# Patient Record
Sex: MALE | Race: WHITE | NOT HISPANIC OR LATINO | Employment: FULL TIME | RURAL
[De-identification: names, ages, dates, MRNs, and addresses within clinical notes are randomized per-mention and may not be internally consistent; named-entity substitution may affect disease eponyms.]

---

## 2022-06-07 ENCOUNTER — OFFICE VISIT (OUTPATIENT)
Dept: FAMILY MEDICINE CLINIC | Facility: CLINIC | Age: 47
End: 2022-06-07

## 2022-06-07 VITALS
DIASTOLIC BLOOD PRESSURE: 60 MMHG | HEIGHT: 70 IN | WEIGHT: 309.8 LBS | BODY MASS INDEX: 44.35 KG/M2 | SYSTOLIC BLOOD PRESSURE: 138 MMHG | TEMPERATURE: 98.2 F | HEART RATE: 80 BPM | OXYGEN SATURATION: 95 %

## 2022-06-07 DIAGNOSIS — Z71.6 TOBACCO ABUSE COUNSELING: ICD-10-CM

## 2022-06-07 DIAGNOSIS — Z12.11 ENCOUNTER FOR SCREENING COLONOSCOPY: ICD-10-CM

## 2022-06-07 DIAGNOSIS — E11.40 TYPE 2 DIABETES MELLITUS WITH DIABETIC NEUROPATHY, WITH LONG-TERM CURRENT USE OF INSULIN: ICD-10-CM

## 2022-06-07 DIAGNOSIS — J44.9 CHRONIC OBSTRUCTIVE PULMONARY DISEASE, UNSPECIFIED COPD TYPE: ICD-10-CM

## 2022-06-07 DIAGNOSIS — I10 ESSENTIAL HYPERTENSION: ICD-10-CM

## 2022-06-07 DIAGNOSIS — Z79.01 CHRONIC ANTICOAGULATION: ICD-10-CM

## 2022-06-07 DIAGNOSIS — Z00.00 ANNUAL PHYSICAL EXAM: ICD-10-CM

## 2022-06-07 DIAGNOSIS — Z99.89 OSA ON CPAP: ICD-10-CM

## 2022-06-07 DIAGNOSIS — Z13.29 ENCOUNTER FOR SCREENING FOR ENDOCRINE DISORDER: ICD-10-CM

## 2022-06-07 DIAGNOSIS — E11.649 UNCONTROLLED TYPE 2 DIABETES MELLITUS WITH HYPOGLYCEMIA WITHOUT COMA: ICD-10-CM

## 2022-06-07 DIAGNOSIS — Z13.1 ENCOUNTER FOR SCREENING FOR DIABETES MELLITUS: ICD-10-CM

## 2022-06-07 DIAGNOSIS — M21.619 BUNION: ICD-10-CM

## 2022-06-07 DIAGNOSIS — E66.01 MORBID (SEVERE) OBESITY DUE TO EXCESS CALORIES: Primary | ICD-10-CM

## 2022-06-07 DIAGNOSIS — Z13.6 ENCOUNTER FOR SCREENING FOR CARDIOVASCULAR DISORDERS: ICD-10-CM

## 2022-06-07 DIAGNOSIS — Z72.0 TOBACCO USER: ICD-10-CM

## 2022-06-07 DIAGNOSIS — E78.2 MIXED HYPERLIPIDEMIA: ICD-10-CM

## 2022-06-07 DIAGNOSIS — Z79.4 TYPE 2 DIABETES MELLITUS WITH DIABETIC NEUROPATHY, WITH LONG-TERM CURRENT USE OF INSULIN: ICD-10-CM

## 2022-06-07 DIAGNOSIS — G47.33 OSA ON CPAP: ICD-10-CM

## 2022-06-07 DIAGNOSIS — I48.11 LONGSTANDING PERSISTENT ATRIAL FIBRILLATION: ICD-10-CM

## 2022-06-07 DIAGNOSIS — Z11.59 NEED FOR HEPATITIS C SCREENING TEST: ICD-10-CM

## 2022-06-07 DIAGNOSIS — E11.8 DIABETIC FOOT: ICD-10-CM

## 2022-06-07 PROCEDURE — 99204 OFFICE O/P NEW MOD 45 MIN: CPT | Performed by: FAMILY MEDICINE

## 2022-06-07 RX ORDER — LISINOPRIL 2.5 MG/1
2.5 TABLET ORAL DAILY
Qty: 30 TABLET | Refills: 3 | Status: SHIPPED | OUTPATIENT
Start: 2022-06-07 | End: 2023-03-13 | Stop reason: SDUPTHER

## 2022-06-07 RX ORDER — LISINOPRIL 2.5 MG/1
2.5 TABLET ORAL DAILY
COMMUNITY
End: 2022-06-07 | Stop reason: SDUPTHER

## 2022-06-07 RX ORDER — ATORVASTATIN CALCIUM 40 MG/1
40 TABLET, FILM COATED ORAL DAILY
Qty: 30 TABLET | Refills: 3 | Status: SHIPPED | OUTPATIENT
Start: 2022-06-07 | End: 2023-03-13 | Stop reason: SDUPTHER

## 2022-06-07 RX ORDER — GABAPENTIN 300 MG/1
300 CAPSULE ORAL 3 TIMES DAILY
COMMUNITY
End: 2022-06-07 | Stop reason: SDUPTHER

## 2022-06-07 RX ORDER — APIXABAN 5 MG/1
5 TABLET, FILM COATED ORAL DAILY
COMMUNITY
Start: 2022-03-05 | End: 2022-06-07 | Stop reason: SDUPTHER

## 2022-06-07 RX ORDER — FLASH GLUCOSE SENSOR
1 KIT MISCELLANEOUS 4 TIMES DAILY
Qty: 1 EACH | Refills: 3 | Status: SHIPPED | OUTPATIENT
Start: 2022-06-07 | End: 2022-06-07 | Stop reason: SDUPTHER

## 2022-06-07 RX ORDER — CELECOXIB 200 MG/1
200 CAPSULE ORAL AS NEEDED
COMMUNITY
Start: 2022-03-22

## 2022-06-07 RX ORDER — ALBUTEROL SULFATE 90 UG/1
2 AEROSOL, METERED RESPIRATORY (INHALATION) EVERY 4 HOURS PRN
Qty: 18 G | Refills: 3 | Status: SHIPPED | OUTPATIENT
Start: 2022-06-07 | End: 2022-09-10 | Stop reason: SDUPTHER

## 2022-06-07 RX ORDER — GEMFIBROZIL 600 MG/1
600 TABLET, FILM COATED ORAL 2 TIMES DAILY
Qty: 60 TABLET | Refills: 3 | Status: SHIPPED | OUTPATIENT
Start: 2022-06-07 | End: 2023-03-13 | Stop reason: SDUPTHER

## 2022-06-07 RX ORDER — FLURBIPROFEN SODIUM 0.3 MG/ML
1 SOLUTION/ DROPS OPHTHALMIC 4 TIMES DAILY
Qty: 100 EACH | Refills: 3 | Status: CANCELLED | OUTPATIENT
Start: 2022-06-07

## 2022-06-07 RX ORDER — INSULIN ASPART 100 [IU]/ML
20 INJECTION, SOLUTION INTRAVENOUS; SUBCUTANEOUS
COMMUNITY
Start: 2022-03-01 | End: 2022-06-07 | Stop reason: SDUPTHER

## 2022-06-07 RX ORDER — FLASH GLUCOSE SENSOR
1 KIT MISCELLANEOUS
Qty: 2 EACH | Refills: 3 | Status: SHIPPED | OUTPATIENT
Start: 2022-06-07 | End: 2022-10-27 | Stop reason: SDUPTHER

## 2022-06-07 RX ORDER — ATENOLOL 25 MG/1
25 TABLET ORAL 2 TIMES DAILY
COMMUNITY
End: 2022-06-07 | Stop reason: SDUPTHER

## 2022-06-07 RX ORDER — FLURBIPROFEN SODIUM 0.3 MG/ML
1 SOLUTION/ DROPS OPHTHALMIC 4 TIMES DAILY
Qty: 1000 EACH | Refills: 3 | Status: SHIPPED | OUTPATIENT
Start: 2022-06-07 | End: 2022-12-19 | Stop reason: SDUPTHER

## 2022-06-07 RX ORDER — ATORVASTATIN CALCIUM 40 MG/1
40 TABLET, FILM COATED ORAL DAILY
COMMUNITY
End: 2022-06-07 | Stop reason: SDUPTHER

## 2022-06-07 RX ORDER — FLASH GLUCOSE SENSOR
KIT MISCELLANEOUS
COMMUNITY
Start: 2022-04-19 | End: 2022-06-07 | Stop reason: SDUPTHER

## 2022-06-07 RX ORDER — METFORMIN HYDROCHLORIDE 500 MG/1
500 TABLET, EXTENDED RELEASE ORAL 3 TIMES DAILY
Qty: 90 TABLET | Refills: 3 | Status: SHIPPED | OUTPATIENT
Start: 2022-06-07 | End: 2023-03-13 | Stop reason: SDUPTHER

## 2022-06-07 RX ORDER — GABAPENTIN 300 MG/1
300 CAPSULE ORAL 3 TIMES DAILY
Qty: 90 CAPSULE | Refills: 2 | Status: SHIPPED | OUTPATIENT
Start: 2022-06-07 | End: 2022-06-07 | Stop reason: SDUPTHER

## 2022-06-07 RX ORDER — ATENOLOL 25 MG/1
25 TABLET ORAL 2 TIMES DAILY
Qty: 60 TABLET | Refills: 3 | Status: SHIPPED | OUTPATIENT
Start: 2022-06-07 | End: 2023-03-13 | Stop reason: SDUPTHER

## 2022-06-07 RX ORDER — GEMFIBROZIL 600 MG/1
TABLET, FILM COATED ORAL 2 TIMES DAILY
COMMUNITY
Start: 2022-04-09 | End: 2022-06-07 | Stop reason: SDUPTHER

## 2022-06-07 RX ORDER — FLURBIPROFEN SODIUM 0.3 MG/ML
SOLUTION/ DROPS OPHTHALMIC
COMMUNITY
Start: 2022-03-29 | End: 2022-06-07 | Stop reason: SDUPTHER

## 2022-06-07 RX ORDER — ALBUTEROL SULFATE 90 UG/1
AEROSOL, METERED RESPIRATORY (INHALATION)
COMMUNITY
Start: 2022-04-19 | End: 2022-06-07 | Stop reason: SDUPTHER

## 2022-06-07 RX ORDER — APIXABAN 5 MG/1
5 TABLET, FILM COATED ORAL DAILY
Qty: 30 TABLET | Refills: 3 | Status: SHIPPED | OUTPATIENT
Start: 2022-06-07 | End: 2022-12-09 | Stop reason: SDUPTHER

## 2022-06-07 RX ORDER — APIXABAN 5 MG/1
5 TABLET, FILM COATED ORAL DAILY
Qty: 60 TABLET | Refills: 3 | Status: SHIPPED | OUTPATIENT
Start: 2022-06-07 | End: 2022-06-07 | Stop reason: SDUPTHER

## 2022-06-07 RX ORDER — INSULIN ASPART 100 [IU]/ML
20 INJECTION, SOLUTION INTRAVENOUS; SUBCUTANEOUS
Qty: 9 ML | Refills: 3 | Status: SHIPPED | OUTPATIENT
Start: 2022-06-07 | End: 2022-06-07 | Stop reason: SDUPTHER

## 2022-06-07 RX ORDER — INSULIN DEGLUDEC 200 U/ML
110 INJECTION, SOLUTION SUBCUTANEOUS
COMMUNITY
Start: 2022-02-28 | End: 2022-06-07 | Stop reason: SDUPTHER

## 2022-06-07 RX ORDER — LORATADINE 10 MG/1
10 TABLET ORAL DAILY
COMMUNITY
End: 2022-06-07 | Stop reason: SDUPTHER

## 2022-06-07 RX ORDER — INSULIN DEGLUDEC 200 U/ML
110 INJECTION, SOLUTION SUBCUTANEOUS NIGHTLY
Qty: 9 ML | Refills: 3 | Status: SHIPPED | OUTPATIENT
Start: 2022-06-07 | End: 2022-06-08 | Stop reason: CLARIF

## 2022-06-07 RX ORDER — PANTOPRAZOLE SODIUM 20 MG/1
20 TABLET, DELAYED RELEASE ORAL DAILY
Qty: 30 TABLET | Refills: 3 | Status: SHIPPED | OUTPATIENT
Start: 2022-06-07 | End: 2023-03-13 | Stop reason: SDUPTHER

## 2022-06-07 RX ORDER — METFORMIN HYDROCHLORIDE 500 MG/1
TABLET, EXTENDED RELEASE ORAL 3 TIMES DAILY
COMMUNITY
Start: 2022-05-21 | End: 2022-06-07 | Stop reason: SDUPTHER

## 2022-06-07 RX ORDER — INSULIN ASPART 100 [IU]/ML
20 INJECTION, SOLUTION INTRAVENOUS; SUBCUTANEOUS
Qty: 9 ML | Refills: 3 | Status: SHIPPED | OUTPATIENT
Start: 2022-06-07 | End: 2022-08-12

## 2022-06-07 RX ORDER — PANTOPRAZOLE SODIUM 20 MG/1
TABLET, DELAYED RELEASE ORAL
COMMUNITY
Start: 2022-03-20 | End: 2022-06-07 | Stop reason: SDUPTHER

## 2022-06-07 RX ORDER — GABAPENTIN 300 MG/1
300 CAPSULE ORAL 3 TIMES DAILY
Qty: 90 CAPSULE | Refills: 2 | Status: SHIPPED | OUTPATIENT
Start: 2022-06-07 | End: 2022-10-19 | Stop reason: SDUPTHER

## 2022-06-07 RX ORDER — LORATADINE 10 MG/1
10 TABLET ORAL DAILY
Qty: 30 TABLET | Refills: 3 | Status: SHIPPED | OUTPATIENT
Start: 2022-06-07 | End: 2022-11-06 | Stop reason: SDUPTHER

## 2022-06-07 NOTE — PATIENT INSTRUCTIONS
Will refer to Gastro for colon cancer screening  -will refer to Heart doctor for atrial fibrillation  -will refer to Podiatry for foot issues    Get labwork fasting downstairs  -will call for test restuls    Recheck in 4 weeks    Sent medications.      Can you bring your blood sugars readings on next visit on paper. Before breakfast and 2 hours after meal

## 2022-06-07 NOTE — TELEPHONE ENCOUNTER
Gabapentin has more than one set of directions.     Insurance will not cover tresiba prefer lantus or levemir.

## 2022-06-07 NOTE — TELEPHONE ENCOUNTER
PHARMACY IS NEEDING A CLARIFICATION ON THE empagliflozin (JARDIANCE) 10 MG tablet tablet. PATIENT IS AT THE PHARMACY WAITING

## 2022-06-08 NOTE — TELEPHONE ENCOUNTER
Faxed received from pharmacy stating insurance prefers lantus or levemir to replace tresiba. Per Dr. Tobin replace with Lantus.

## 2022-06-09 ENCOUNTER — TELEPHONE (OUTPATIENT)
Dept: FAMILY MEDICINE CLINIC | Facility: CLINIC | Age: 47
End: 2022-06-09

## 2022-06-10 ENCOUNTER — TELEPHONE (OUTPATIENT)
Dept: FAMILY MEDICINE CLINIC | Facility: CLINIC | Age: 47
End: 2022-06-10

## 2022-06-11 RX ORDER — INSULIN DEGLUDEC 200 U/ML
110 INJECTION, SOLUTION SUBCUTANEOUS NIGHTLY
Qty: 9 ML | Refills: 3 | Status: SHIPPED | OUTPATIENT
Start: 2022-06-11 | End: 2022-07-22

## 2022-06-13 ENCOUNTER — TELEPHONE (OUTPATIENT)
Dept: FAMILY MEDICINE CLINIC | Facility: CLINIC | Age: 47
End: 2022-06-13

## 2022-06-20 ENCOUNTER — LAB (OUTPATIENT)
Dept: LAB | Facility: HOSPITAL | Age: 47
End: 2022-06-20

## 2022-06-20 DIAGNOSIS — Z00.00 ANNUAL PHYSICAL EXAM: ICD-10-CM

## 2022-06-20 DIAGNOSIS — Z13.1 ENCOUNTER FOR SCREENING FOR DIABETES MELLITUS: ICD-10-CM

## 2022-06-20 DIAGNOSIS — Z13.29 ENCOUNTER FOR SCREENING FOR ENDOCRINE DISORDER: ICD-10-CM

## 2022-06-20 DIAGNOSIS — E11.649 UNCONTROLLED TYPE 2 DIABETES MELLITUS WITH HYPOGLYCEMIA WITHOUT COMA: ICD-10-CM

## 2022-06-20 DIAGNOSIS — Z11.59 NEED FOR HEPATITIS C SCREENING TEST: ICD-10-CM

## 2022-06-20 DIAGNOSIS — Z13.6 ENCOUNTER FOR SCREENING FOR CARDIOVASCULAR DISORDERS: ICD-10-CM

## 2022-06-20 LAB
25(OH)D3 SERPL-MCNC: 10.6 NG/ML (ref 30–100)
ALBUMIN SERPL-MCNC: 4.4 G/DL (ref 3.5–5.2)
ALBUMIN UR-MCNC: 3.5 MG/DL
ALBUMIN/GLOB SERPL: 1.8 G/DL
ALP SERPL-CCNC: 108 U/L (ref 39–117)
ALT SERPL W P-5'-P-CCNC: 9 U/L (ref 1–41)
ANION GAP SERPL CALCULATED.3IONS-SCNC: 15.3 MMOL/L (ref 5–15)
AST SERPL-CCNC: 13 U/L (ref 1–40)
BASOPHILS # BLD AUTO: 0.07 10*3/MM3 (ref 0–0.2)
BASOPHILS NFR BLD AUTO: 0.8 % (ref 0–1.5)
BILIRUB SERPL-MCNC: 0.4 MG/DL (ref 0–1.2)
BUN SERPL-MCNC: 10 MG/DL (ref 6–20)
BUN/CREAT SERPL: 15.4 (ref 7–25)
CALCIUM SPEC-SCNC: 9 MG/DL (ref 8.6–10.5)
CHLORIDE SERPL-SCNC: 100 MMOL/L (ref 98–107)
CHOLEST SERPL-MCNC: 105 MG/DL (ref 0–200)
CO2 SERPL-SCNC: 22.7 MMOL/L (ref 22–29)
CREAT SERPL-MCNC: 0.65 MG/DL (ref 0.76–1.27)
CREAT UR-MCNC: 74.3 MG/DL
DEPRECATED RDW RBC AUTO: 41.1 FL (ref 37–54)
EGFRCR SERPLBLD CKD-EPI 2021: 117 ML/MIN/1.73
EOSINOPHIL # BLD AUTO: 0.12 10*3/MM3 (ref 0–0.4)
EOSINOPHIL NFR BLD AUTO: 1.4 % (ref 0.3–6.2)
ERYTHROCYTE [DISTWIDTH] IN BLOOD BY AUTOMATED COUNT: 14.3 % (ref 12.3–15.4)
GLOBULIN UR ELPH-MCNC: 2.5 GM/DL
GLUCOSE SERPL-MCNC: 142 MG/DL (ref 65–99)
HBA1C MFR BLD: 9 % (ref 4.8–5.6)
HCT VFR BLD AUTO: 53.4 % (ref 37.5–51)
HCV AB SER DONR QL: NORMAL
HDLC SERPL-MCNC: 24 MG/DL (ref 40–60)
HGB BLD-MCNC: 17.7 G/DL (ref 13–17.7)
IMM GRANULOCYTES # BLD AUTO: 0.03 10*3/MM3 (ref 0–0.05)
IMM GRANULOCYTES NFR BLD AUTO: 0.3 % (ref 0–0.5)
LDLC SERPL CALC-MCNC: 56 MG/DL (ref 0–100)
LDLC/HDLC SERPL: 2.19 {RATIO}
LYMPHOCYTES # BLD AUTO: 3.05 10*3/MM3 (ref 0.7–3.1)
LYMPHOCYTES NFR BLD AUTO: 34.6 % (ref 19.6–45.3)
MCH RBC QN AUTO: 28 PG (ref 26.6–33)
MCHC RBC AUTO-ENTMCNC: 33.1 G/DL (ref 31.5–35.7)
MCV RBC AUTO: 84.5 FL (ref 79–97)
MICROALBUMIN/CREAT UR: 47.1 MG/G
MONOCYTES # BLD AUTO: 0.61 10*3/MM3 (ref 0.1–0.9)
MONOCYTES NFR BLD AUTO: 6.9 % (ref 5–12)
NEUTROPHILS NFR BLD AUTO: 4.94 10*3/MM3 (ref 1.7–7)
NEUTROPHILS NFR BLD AUTO: 56 % (ref 42.7–76)
NRBC BLD AUTO-RTO: 0 /100 WBC (ref 0–0.2)
PLATELET # BLD AUTO: 292 10*3/MM3 (ref 140–450)
PMV BLD AUTO: 10.3 FL (ref 6–12)
POTASSIUM SERPL-SCNC: 3.8 MMOL/L (ref 3.5–5.2)
PROT SERPL-MCNC: 6.9 G/DL (ref 6–8.5)
RBC # BLD AUTO: 6.32 10*6/MM3 (ref 4.14–5.8)
SODIUM SERPL-SCNC: 138 MMOL/L (ref 136–145)
T4 FREE SERPL-MCNC: 1.17 NG/DL (ref 0.93–1.7)
TRIGL SERPL-MCNC: 142 MG/DL (ref 0–150)
TSH SERPL DL<=0.05 MIU/L-ACNC: 3.62 UIU/ML (ref 0.27–4.2)
VIT B12 BLD-MCNC: 379 PG/ML (ref 211–946)
VLDLC SERPL-MCNC: 25 MG/DL (ref 5–40)
WBC NRBC COR # BLD: 8.82 10*3/MM3 (ref 3.4–10.8)

## 2022-06-20 PROCEDURE — 82043 UR ALBUMIN QUANTITATIVE: CPT

## 2022-06-20 PROCEDURE — 84443 ASSAY THYROID STIM HORMONE: CPT

## 2022-06-20 PROCEDURE — 85025 COMPLETE CBC W/AUTO DIFF WBC: CPT

## 2022-06-20 PROCEDURE — 83036 HEMOGLOBIN GLYCOSYLATED A1C: CPT

## 2022-06-20 PROCEDURE — 82306 VITAMIN D 25 HYDROXY: CPT

## 2022-06-20 PROCEDURE — 84439 ASSAY OF FREE THYROXINE: CPT

## 2022-06-20 PROCEDURE — 82607 VITAMIN B-12: CPT

## 2022-06-20 PROCEDURE — 86803 HEPATITIS C AB TEST: CPT

## 2022-06-20 PROCEDURE — 80061 LIPID PANEL: CPT

## 2022-06-20 PROCEDURE — 80053 COMPREHEN METABOLIC PANEL: CPT

## 2022-06-20 PROCEDURE — 82570 ASSAY OF URINE CREATININE: CPT

## 2022-06-21 ENCOUNTER — TELEPHONE (OUTPATIENT)
Dept: FAMILY MEDICINE CLINIC | Facility: CLINIC | Age: 47
End: 2022-06-21

## 2022-06-21 DIAGNOSIS — J44.9 CHRONIC OBSTRUCTIVE PULMONARY DISEASE, UNSPECIFIED COPD TYPE: Primary | ICD-10-CM

## 2022-06-21 RX ORDER — ERGOCALCIFEROL 1.25 MG/1
50000 CAPSULE ORAL WEEKLY
Qty: 5 CAPSULE | Refills: 3 | Status: SHIPPED | OUTPATIENT
Start: 2022-06-21 | End: 2023-03-13 | Stop reason: SDUPTHER

## 2022-06-21 NOTE — TELEPHONE ENCOUNTER
----- Message from Prince Tobin MD sent at 6/20/2022  8:51 PM CDT -----  Please call pt    Hga1c at 9.00 and not at goal. Recommend pt start on trulicity injections once a week give 0.75 mg sub q weekly. May cause GI upset and nausea/vomiting. Will help reduce appetite and help lose weight as well. Give 4 pens and 3 refills     Vitamin D is low and recommend pt start on vitamin D3 50,000 units once a week give 4 pills and 3 refills     On lipid panel HDL low and recommend heart healthy diet     Rest of labwork stable    Recheck on next visit thanks

## 2022-06-24 ENCOUNTER — TELEPHONE (OUTPATIENT)
Dept: FAMILY MEDICINE CLINIC | Facility: CLINIC | Age: 47
End: 2022-06-24

## 2022-06-24 NOTE — TELEPHONE ENCOUNTER
Prince Tobin MD  You 29 minutes ago (1:56 PM)       Have pt start on ozempic 0.5 mg subq weekly give 4 pens and 3 refills     Please discontinue trulicity on medication list     Thank you     Message text       Sent pt [employer] message.

## 2022-06-24 NOTE — TELEPHONE ENCOUNTER
Insurance will not cover Trulicity. Pt must try and fail Mono Mar, Albert Lucero, Victoza.    Please advise.

## 2022-06-27 ENCOUNTER — TELEPHONE (OUTPATIENT)
Dept: FAMILY MEDICINE CLINIC | Facility: CLINIC | Age: 47
End: 2022-06-27

## 2022-07-07 RX ORDER — ORAL SEMAGLUTIDE 3 MG/1
3 TABLET ORAL DAILY
Qty: 30 TABLET | Refills: 3 | Status: SHIPPED | OUTPATIENT
Start: 2022-07-07 | End: 2023-01-27

## 2022-07-19 ENCOUNTER — OFFICE VISIT (OUTPATIENT)
Dept: PODIATRY | Facility: CLINIC | Age: 47
End: 2022-07-19

## 2022-07-19 VITALS — WEIGHT: 309 LBS | BODY MASS INDEX: 44.24 KG/M2 | HEART RATE: 74 BPM | HEIGHT: 70 IN | OXYGEN SATURATION: 96 %

## 2022-07-19 DIAGNOSIS — L60.2 ONYCHOGRYPHOSIS: ICD-10-CM

## 2022-07-19 DIAGNOSIS — E11.9 ENCOUNTER FOR DIABETIC FOOT EXAM: Primary | ICD-10-CM

## 2022-07-19 DIAGNOSIS — M79.675 CHRONIC TOE PAIN, BILATERAL: ICD-10-CM

## 2022-07-19 DIAGNOSIS — E11.42 TYPE 2 DIABETES MELLITUS WITH PERIPHERAL NEUROPATHY: ICD-10-CM

## 2022-07-19 DIAGNOSIS — B35.1 ONYCHOMYCOSIS: ICD-10-CM

## 2022-07-19 DIAGNOSIS — M79.674 CHRONIC TOE PAIN, BILATERAL: ICD-10-CM

## 2022-07-19 DIAGNOSIS — G89.29 CHRONIC TOE PAIN, BILATERAL: ICD-10-CM

## 2022-07-19 PROCEDURE — 99203 OFFICE O/P NEW LOW 30 MIN: CPT | Performed by: PODIATRIST

## 2022-07-19 PROCEDURE — 11721 DEBRIDE NAIL 6 OR MORE: CPT | Performed by: PODIATRIST

## 2022-07-19 NOTE — PROGRESS NOTES
Shun Coleman  1975  47 y.o. male   PCP: Prince Tobin 6/7/2022  BS: 151 PER PATIENT       07/19/2022    Chief Complaint   Patient presents with   • Left Foot - Follow-up     Diabetic foot care    • Right Foot - Follow-up     Diabetic foot care        History of Present Illness    Shun Coleman is a 47 y.o.male came to clinic for diabetic foot care.        Past Medical History:   Diagnosis Date   • Allergic    • Asthma    • Atrial fibrillation (HCC)    • COPD (chronic obstructive pulmonary disease) (HCC)    • Diabetes mellitus (HCC)    • GERD (gastroesophageal reflux disease)    • Hyperlipidemia    • Hypertension          Past Surgical History:   Procedure Laterality Date   • CARDIAC ABLATION           Family History   Problem Relation Age of Onset   • Heart disease Mother    • Kidney disease Father    • Heart disease Father    • Kidney disease Sister        No Known Allergies    Social History     Socioeconomic History   • Marital status:    Tobacco Use   • Smoking status: Current Every Day Smoker   • Smokeless tobacco: Never Used   Substance and Sexual Activity   • Alcohol use: Not Currently   • Drug use: Not Currently   • Sexual activity: Defer         Current Outpatient Medications   Medication Sig Dispense Refill   • atenolol (TENORMIN) 25 MG tablet Take 1 tablet by mouth 2 (Two) Times a Day. 60 tablet 3   • atorvastatin (LIPITOR) 40 MG tablet Take 1 tablet by mouth Daily. 30 tablet 3   • B-D UF III MINI PEN NEEDLES 31G X 5 MM misc Inject 1 application under the skin into the appropriate area as directed 4 (Four) Times a Day. 1000 each 3   • celecoxib (CeleBREX) 200 MG capsule      • Continuous Blood Gluc Sensor (FreeStyle Laurita 14 Day Sensor) misc 1 application Every 14 (Fourteen) Days. 2 each 3   • Eliquis 5 MG tablet tablet Take 1 tablet by mouth Daily. 30 tablet 3   • empagliflozin (Jardiance) 25 MG tablet tablet Take 1 tablet by mouth Daily. 30 tablet 3   • FLUTICASONE PROPIONATE, NASAL, NA  "into the nostril(s) as directed by provider.     • gabapentin (NEURONTIN) 300 MG capsule Take 1 capsule by mouth 3 (Three) Times a Day. 90 capsule 2   • gemfibrozil (LOPID) 600 MG tablet Take 1 tablet by mouth 2 (Two) Times a Day. 60 tablet 3   • Insulin Degludec (Tresiba FlexTouch) 200 UNIT/ML solution pen-injector pen injection Inject 110 Units under the skin into the appropriate area as directed Every Night. 9 mL 3   • lisinopril (PRINIVIL,ZESTRIL) 2.5 MG tablet Take 1 tablet by mouth Daily. 30 tablet 3   • loratadine (CLARITIN) 10 MG tablet Take 1 tablet by mouth Daily. 30 tablet 3   • metFORMIN ER (GLUCOPHAGE-XR) 500 MG 24 hr tablet Take 1 tablet by mouth 3 (Three) Times a Day. 90 tablet 3   • NovoLOG FlexPen 100 UNIT/ML solution pen-injector sc pen Inject 20 Units under the skin into the appropriate area as directed 3 (Three) Times a Day With Meals. No more than 70 units a day Uses sliding scale 9 mL 3   • pantoprazole (PROTONIX) 20 MG EC tablet Take 1 tablet by mouth Daily. 30 tablet 3   • Semaglutide (Rybelsus) 3 MG tablet Take 1 tablet by mouth Daily. 30 tablet 3   • Ventolin  (90 Base) MCG/ACT inhaler Inhale 2 puffs Every 4 (Four) Hours As Needed for Wheezing. 18 g 3   • vitamin D (ERGOCALCIFEROL) 1.25 MG (00814 UT) capsule capsule Take 1 capsule by mouth 1 (One) Time Per Week. 5 capsule 3     No current facility-administered medications for this visit.       Review of Systems   Constitutional: Negative.    HENT: Negative.    Eyes: Negative.    Respiratory: Negative.    Cardiovascular: Negative.    Gastrointestinal: Negative.    Endocrine: Negative.    Genitourinary: Negative.    Musculoskeletal:        FOOT PAIN    Skin: Negative.    Allergic/Immunologic: Negative.    Neurological: Negative.    Hematological: Negative.    Psychiatric/Behavioral: Negative.          OBJECTIVE    Pulse 74   Ht 177.8 cm (70\")   Wt (!) 140 kg (309 lb)   SpO2 96%   BMI 44.34 kg/m²     Physical Exam  Vitals reviewed. "   Constitutional:       General: He is not in acute distress.     Appearance: He is well-developed.   HENT:      Head: Normocephalic and atraumatic.      Nose: Nose normal.   Eyes:      Conjunctiva/sclera: Conjunctivae normal.      Pupils: Pupils are equal, round, and reactive to light.   Cardiovascular:      Pulses:           Dorsalis pedis pulses are 2+ on the right side and 2+ on the left side.        Posterior tibial pulses are 2+ on the right side and 2+ on the left side.   Pulmonary:      Effort: Pulmonary effort is normal. No respiratory distress.      Breath sounds: No wheezing.   Musculoskeletal:      Right foot: No Charcot foot or foot drop.      Left foot: No Charcot foot or foot drop.   Feet:      Right foot:      Protective Sensation: 10 sites tested. 5 sites sensed.      Skin integrity: Skin integrity normal.      Toenail Condition: Right toenails are abnormally thick and long. Fungal disease present.     Left foot:      Protective Sensation: 10 sites tested. 5 sites sensed.      Skin integrity: Skin integrity normal.      Toenail Condition: Left toenails are abnormally thick and long. Fungal disease present.     Comments: Nails 1 through 5 bilateral are thickened, discolored, elongated with subungual debris.  Pain on palpation to the nail plates.  Skin:     General: Skin is warm and dry.      Capillary Refill: Capillary refill takes less than 2 seconds.   Neurological:      Mental Status: He is alert and oriented to person, place, and time.   Psychiatric:         Behavior: Behavior normal.         Thought Content: Thought content normal.                Procedures        ASSESSMENT AND PLAN    Diagnoses and all orders for this visit:    1. Encounter for diabetic foot exam (HCC) (Primary)    2. Type 2 diabetes mellitus with peripheral neuropathy (HCC)    3. Chronic toe pain, bilateral    4. Onychogryphosis    5. Onychomycosis        - A diabetic foot screening exam was performed and the patient was  educated on the foot complications related to diabetes,  preventative foot care and what signs and symptoms to watch for.  Instructed to contact our office if any foot problems develop before next visit.  -Discussed treatments for  painful toenails. Treatment options discussed included nail removal versus debridement. Patient elected for routine nail debridement. An ABN has been signed by the patient.  - Toenails 1-5 bilateral were debrided in length and thickness with nail nipper and electric  to decrease fungal load and risk of infection.  - rx for custom orthotics  - All the patients questions were answered.  - RTC 3 months or sooner if needed.              This document has been electronically signed by Michael Collins DPM on July 22, 2022 11:45 CDT     7/22/2022  11:45 CDT

## 2022-07-22 ENCOUNTER — OFFICE VISIT (OUTPATIENT)
Dept: GASTROENTEROLOGY | Facility: CLINIC | Age: 47
End: 2022-07-22

## 2022-07-22 VITALS
HEIGHT: 70 IN | BODY MASS INDEX: 41.09 KG/M2 | WEIGHT: 287 LBS | HEART RATE: 78 BPM | SYSTOLIC BLOOD PRESSURE: 118 MMHG | DIASTOLIC BLOOD PRESSURE: 61 MMHG

## 2022-07-22 DIAGNOSIS — Z12.11 ENCOUNTER FOR SCREENING FOR MALIGNANT NEOPLASM OF COLON: Primary | ICD-10-CM

## 2022-07-22 DIAGNOSIS — K21.9 GASTROESOPHAGEAL REFLUX DISEASE, UNSPECIFIED WHETHER ESOPHAGITIS PRESENT: ICD-10-CM

## 2022-07-22 PROCEDURE — 99203 OFFICE O/P NEW LOW 30 MIN: CPT | Performed by: PHYSICIAN ASSISTANT

## 2022-07-22 RX ORDER — INSULIN GLARGINE 100 [IU]/ML
100 INJECTION, SOLUTION SUBCUTANEOUS DAILY
COMMUNITY
End: 2022-09-12 | Stop reason: SDUPTHER

## 2022-07-22 RX ORDER — DEXTROSE AND SODIUM CHLORIDE 5; .45 G/100ML; G/100ML
30 INJECTION, SOLUTION INTRAVENOUS CONTINUOUS PRN
Status: CANCELLED | OUTPATIENT
Start: 2022-09-02

## 2022-07-22 NOTE — PROGRESS NOTES
Chief Complaint   Patient presents with   • Colonoscopy Consultation       ENDO PROCEDURE ORDERED: COLON screen    Subjective    Shun Coleman is a 47 y.o. male. he is being seen for consultation today at the request of Prince Tobin MD    History of Present Illness    This 47-year-old male was sent for evaluation with colonoscopy by Dr. Tobin who saw the patient to establish with diabetes, hypertension, 2022. Patient currently denies abdominal pain. GERD is well controlled on the Protonix 40 mg daily. He denied nausea, vomiting, diarrhea, constipation, bowels are moving without blood or mucus. Weight has been stable. He has never had endoscopy. He is on Eliquis.     Laboratory 2022 CBC was fairly normal, CMP showed a glucose 142, otherwise normal. A1c was 9.0. He was started on Trulicity. Normal cholesterol, TSH, T4, B12, hepatitis C antibody nonreactive. Vitamin D was low at 10.6.     Patient is a pack a day smoker for 35 years, strongly urged to quit. Denied alcohol or illicit substance use. He has had a cardiac ablation, broken left hand. History of A-fib, asthma, diabetes. Family history of diabetes, unknown cancer, stroke, hypertension, gallstones, stroke, allergies. Father and mother both  of heart, father at age 50, mother age 63. Spouse in good health,  previously. Brother and sister in good health, 6 children in good health.     A/P: Patient due for a screening colonoscopy. Discussed the possibility of EGD given potential for Barker's. He declined. He states he does work 3rd shift and wants to try to do his colonoscopy on a Friday. Last LFT's were normal. Encouraged continued aggressive treatment of his blood sugars. Will plan further pending clinical course and the results of the above.     Thank you very much Dr. Tobin for involving us in the care of your patient. Will keep you informed.       The following portions of the patient's history were reviewed and updated as  "appropriate:   Past Medical History:   Diagnosis Date   • Allergic    • Asthma    • Atrial fibrillation (HCC)    • COPD (chronic obstructive pulmonary disease) (HCC)    • Diabetes mellitus (HCC)    • GERD (gastroesophageal reflux disease)    • Hyperlipidemia    • Hypertension    • Sleep apnea      Past Surgical History:   Procedure Laterality Date   • CARDIAC ABLATION       Family History   Problem Relation Age of Onset   • Heart disease Mother    • Kidney disease Father    • Heart disease Father    • Kidney disease Sister    • Diabetes Other    • Cancer Other    • Stroke Other    • Hypertension Other    • Allergy (severe) Other    • Cholelithiasis Other        No Known Allergies  Social History     Socioeconomic History   • Marital status:    Tobacco Use   • Smoking status: Current Every Day Smoker     Packs/day: 1.00     Years: 35.00     Pack years: 35.00   • Smokeless tobacco: Former User     Types: Chew   Vaping Use   • Vaping Use: Former   • Substances: Nicotine   • Devices: RefHightowerble tank   Substance and Sexual Activity   • Alcohol use: Not Currently   • Drug use: Not Currently     Types: \"Crack\" cocaine, Marijuana   • Sexual activity: Defer     Comment: .     Current Medications:  Prior to Admission medications    Medication Sig Start Date End Date Taking? Authorizing Provider   atenolol (TENORMIN) 25 MG tablet Take 1 tablet by mouth 2 (Two) Times a Day. 6/7/22  Yes Prince Tobin MD   atorvastatin (LIPITOR) 40 MG tablet Take 1 tablet by mouth Daily. 6/7/22  Yes Prince Tobin MD   B-D UF III MINI PEN NEEDLES 31G X 5 MM misc Inject 1 application under the skin into the appropriate area as directed 4 (Four) Times a Day. 6/7/22  Yes Prince Tobin MD   celecoxib (CeleBREX) 200 MG capsule Take 200 mg by mouth As Needed. 3/22/22  Yes Carolynn Colvin MD   Continuous Blood Gluc Sensor (FreeStyle Laurita 14 Day Sensor) misc 1 application Every 14 (Fourteen) Days. 6/7/22  Yes Prince Tobin, " MD   Eliquis 5 MG tablet tablet Take 1 tablet by mouth Daily.  Patient taking differently: Take 5 mg by mouth 2 (Two) Times a Day. 6/7/22  Yes Prince Tobin MD   empagliflozin (Jardiance) 25 MG tablet tablet Take 1 tablet by mouth Daily. 6/7/22  Yes Prince Tobin MD   FLUTICASONE PROPIONATE, NASAL, NA 2 sprays into the nostril(s) as directed by provider Daily.   Yes ProviderCarolynn MD   gabapentin (NEURONTIN) 300 MG capsule Take 1 capsule by mouth 3 (Three) Times a Day.  Patient taking differently: 2 capsules by mouth 3 times per day 6/7/22  Yes Prince Tobin MD   gemfibrozil (LOPID) 600 MG tablet Take 1 tablet by mouth 2 (Two) Times a Day. 6/7/22  Yes Prince Tobin MD   insulin glargine (LANTUS, SEMGLEE) 100 UNIT/ML injection Inject 100 Units under the skin into the appropriate area as directed Daily.   Yes ProviderCarolynn MD   lisinopril (PRINIVIL,ZESTRIL) 2.5 MG tablet Take 1 tablet by mouth Daily. 6/7/22  Yes Prince Tobin MD   loratadine (CLARITIN) 10 MG tablet Take 1 tablet by mouth Daily. 6/7/22  Yes Prince Tobin MD   metFORMIN ER (GLUCOPHAGE-XR) 500 MG 24 hr tablet Take 1 tablet by mouth 3 (Three) Times a Day. 6/7/22  Yes Prince Tobin MD   NovoLOG FlexPen 100 UNIT/ML solution pen-injector sc pen Inject 20 Units under the skin into the appropriate area as directed 3 (Three) Times a Day With Meals. No more than 70 units a day Uses sliding scale 6/7/22  Yes Prince Tobin MD   pantoprazole (PROTONIX) 20 MG EC tablet Take 1 tablet by mouth Daily.  Patient taking differently: Take 20 mg by mouth Daily As Needed. 6/7/22  Yes Prince Tobin MD   Semaglutide (Rybelsus) 3 MG tablet Take 1 tablet by mouth Daily. 7/7/22  Yes Prince Tobin MD   Ventolin  (90 Base) MCG/ACT inhaler Inhale 2 puffs Every 4 (Four) Hours As Needed for Wheezing. 6/7/22  Yes Prince Tobin MD   vitamin D (ERGOCALCIFEROL) 1.25 MG (85461 UT) capsule capsule Take 1 capsule by mouth 1 (One) Time  "Per Week. 6/21/22  Yes Prince Tobin MD   Insulin Degludec (Tresiba FlexTouch) 200 UNIT/ML solution pen-injector pen injection Inject 110 Units under the skin into the appropriate area as directed Every Night. 6/11/22 7/22/22  Prince Tobin MD     Review of Systems  Review of Systems   Constitutional: Negative for unexpected weight change.   HENT: Negative for trouble swallowing.    Gastrointestinal: Positive for abdominal pain. Negative for abdominal distention, anal bleeding, blood in stool, constipation, diarrhea, nausea, rectal pain and vomiting.          Objective    /61   Pulse 78   Ht 177.8 cm (70\")   Wt 130 kg (287 lb)   BMI 41.18 kg/m²   Physical Exam  Vitals and nursing note reviewed.   Constitutional:       General: He is not in acute distress.     Appearance: He is well-developed. He is obese.   HENT:      Head: Normocephalic and atraumatic.   Eyes:      Pupils: Pupils are equal, round, and reactive to light.   Cardiovascular:      Rate and Rhythm: Normal rate and regular rhythm.      Heart sounds: Normal heart sounds.   Pulmonary:      Effort: Pulmonary effort is normal.      Breath sounds: Normal breath sounds.   Abdominal:      General: Bowel sounds are normal. There is no distension or abdominal bruit.      Palpations: Abdomen is soft. Abdomen is not rigid. There is no shifting dullness or mass.      Tenderness: There is abdominal tenderness. There is no guarding or rebound.      Hernia: No hernia is present. There is no hernia in the ventral area.   Musculoskeletal:         General: Normal range of motion.      Cervical back: Normal range of motion.   Skin:     General: Skin is warm and dry.   Neurological:      Mental Status: He is alert and oriented to person, place, and time.   Psychiatric:         Behavior: Behavior normal.         Thought Content: Thought content normal.         Judgment: Judgment normal.       Assessment & Plan      1. Encounter for screening for malignant " neoplasm of colon    2. Gastroesophageal reflux disease, unspecified whether esophagitis present    .   Diagnoses and all orders for this visit:    1. Encounter for screening for malignant neoplasm of colon (Primary)  -     Case Request; Standing  -     Case Request    2. Gastroesophageal reflux disease, unspecified whether esophagitis present    Other orders  -     Follow Anesthesia Guidelines / Standing Orders; Future  -     Obtain Informed Consent; Future  -     polyethylene glycol (GoLYTELY) 236 g solution; Utilize as directed per instructions received from office of Prince Horton PA-C  Dispense: 4000 mL; Refill: 0        Orders placed during this encounter include:  Orders Placed This Encounter   Procedures   • Follow Anesthesia Guidelines / Standing Orders     Standing Status:   Future   • Obtain Informed Consent     Standing Status:   Future     Order Specific Question:   Informed Consent Given For     Answer:   COLONOSCOPY       Medications prescribed:  New Medications Ordered This Visit   Medications   • polyethylene glycol (GoLYTELY) 236 g solution     Sig: Utilize as directed per instructions received from office of Prince Horton PA-C     Dispense:  4000 mL     Refill:  0     Discontinued Medications       Reason for Discontinue     Insulin Degludec (Tresiba FlexTouch) 200 UNIT/ML solution pen-injector pen injection    *Therapy completed        Requested Prescriptions     Signed Prescriptions Disp Refills   • polyethylene glycol (GoLYTELY) 236 g solution 4000 mL 0     Sig: Utilize as directed per instructions received from office of Prince Horton PA-C       Review and/or summary of lab tests, radiology, procedures, medications. Review and summary of old records and obtaining of history. The risks and benefits of my recommendations, as well as other treatment options were discussed with the patient today. Questions were answered.    Follow-up: Return if symptoms worsen or fail to improve, for Next  scheduled follow up.     COLONOSCOPY (N/A)      This document has been electronically signed by Prince Horton PA-C on July 25, 2022 17:26 CDT      Results for orders placed or performed in visit on 06/20/22   CBC Auto Differential    Specimen: Blood   Result Value Ref Range    WBC 8.82 3.40 - 10.80 10*3/mm3    RBC 6.32 (H) 4.14 - 5.80 10*6/mm3    Hemoglobin 17.7 13.0 - 17.7 g/dL    Hematocrit 53.4 (H) 37.5 - 51.0 %    MCV 84.5 79.0 - 97.0 fL    MCH 28.0 26.6 - 33.0 pg    MCHC 33.1 31.5 - 35.7 g/dL    RDW 14.3 12.3 - 15.4 %    RDW-SD 41.1 37.0 - 54.0 fl    MPV 10.3 6.0 - 12.0 fL    Platelets 292 140 - 450 10*3/mm3    Neutrophil % 56.0 42.7 - 76.0 %    Lymphocyte % 34.6 19.6 - 45.3 %    Monocyte % 6.9 5.0 - 12.0 %    Eosinophil % 1.4 0.3 - 6.2 %    Basophil % 0.8 0.0 - 1.5 %    Immature Grans % 0.3 0.0 - 0.5 %    Neutrophils, Absolute 4.94 1.70 - 7.00 10*3/mm3    Lymphocytes, Absolute 3.05 0.70 - 3.10 10*3/mm3    Monocytes, Absolute 0.61 0.10 - 0.90 10*3/mm3    Eosinophils, Absolute 0.12 0.00 - 0.40 10*3/mm3    Basophils, Absolute 0.07 0.00 - 0.20 10*3/mm3    Immature Grans, Absolute 0.03 0.00 - 0.05 10*3/mm3    nRBC 0.0 0.0 - 0.2 /100 WBC   Hepatitis C Antibody    Specimen: Blood   Result Value Ref Range    Hepatitis C Ab Non-Reactive Non-Reactive   Microalbumin / Creatinine Urine Ratio - Urine, Clean Catch    Specimen: Urine, Clean Catch   Result Value Ref Range    Microalbumin/Creatinine Ratio 47.1 mg/g    Creatinine, Urine 74.3 mg/dL    Microalbumin, Urine 3.5 mg/dL   Vitamin D 25 Hydroxy    Specimen: Blood   Result Value Ref Range    25 Hydroxy, Vitamin D 10.6 (L) 30.0 - 100.0 ng/ml   TSH    Specimen: Blood   Result Value Ref Range    TSH 3.620 0.270 - 4.200 uIU/mL   T4, Free    Specimen: Blood   Result Value Ref Range    Free T4 1.17 0.93 - 1.70 ng/dL   Hemoglobin A1c    Specimen: Blood   Result Value Ref Range    Hemoglobin A1C 9.00 (H) 4.80 - 5.60 %   Vitamin B12    Specimen: Blood   Result Value Ref Range     Vitamin B-12 379 211 - 946 pg/mL   Lipid Panel    Specimen: Blood   Result Value Ref Range    Total Cholesterol 105 0 - 200 mg/dL    Triglycerides 142 0 - 150 mg/dL    HDL Cholesterol 24 (L) 40 - 60 mg/dL    LDL Cholesterol  56 0 - 100 mg/dL    VLDL Cholesterol 25 5 - 40 mg/dL    LDL/HDL Ratio 2.19    Comprehensive Metabolic Panel    Specimen: Blood   Result Value Ref Range    Glucose 142 (H) 65 - 99 mg/dL    BUN 10 6 - 20 mg/dL    Creatinine 0.65 (L) 0.76 - 1.27 mg/dL    Sodium 138 136 - 145 mmol/L    Potassium 3.8 3.5 - 5.2 mmol/L    Chloride 100 98 - 107 mmol/L    CO2 22.7 22.0 - 29.0 mmol/L    Calcium 9.0 8.6 - 10.5 mg/dL    Total Protein 6.9 6.0 - 8.5 g/dL    Albumin 4.40 3.50 - 5.20 g/dL    ALT (SGPT) 9 1 - 41 U/L    AST (SGOT) 13 1 - 40 U/L    Alkaline Phosphatase 108 39 - 117 U/L    Total Bilirubin 0.4 0.0 - 1.2 mg/dL    Globulin 2.5 gm/dL    A/G Ratio 1.8 g/dL    BUN/Creatinine Ratio 15.4 7.0 - 25.0    Anion Gap 15.3 (H) 5.0 - 15.0 mmol/L    eGFR 117.0 >60.0 mL/min/1.73

## 2022-07-25 ENCOUNTER — TRANSCRIBE ORDERS (OUTPATIENT)
Dept: PODIATRY | Facility: CLINIC | Age: 47
End: 2022-07-25

## 2022-07-25 DIAGNOSIS — E11.42 TYPE 2 DIABETES MELLITUS WITH POLYNEUROPATHY: Primary | ICD-10-CM

## 2022-08-04 ENCOUNTER — HOSPITAL ENCOUNTER (OUTPATIENT)
Dept: PHYSICAL THERAPY | Facility: HOSPITAL | Age: 47
Setting detail: THERAPIES SERIES
Discharge: HOME | End: 2022-08-04

## 2022-08-04 DIAGNOSIS — Z79.4 TYPE 2 DIABETES MELLITUS WITH DIABETIC NEUROPATHY, WITH LONG-TERM CURRENT USE OF INSULIN: Primary | ICD-10-CM

## 2022-08-04 DIAGNOSIS — M21.619 BUNION: ICD-10-CM

## 2022-08-04 DIAGNOSIS — E11.8 DIABETIC FOOT: ICD-10-CM

## 2022-08-04 DIAGNOSIS — E11.40 TYPE 2 DIABETES MELLITUS WITH DIABETIC NEUROPATHY, WITH LONG-TERM CURRENT USE OF INSULIN: Primary | ICD-10-CM

## 2022-08-04 DIAGNOSIS — E66.01 MORBID (SEVERE) OBESITY DUE TO EXCESS CALORIES: ICD-10-CM

## 2022-08-04 PROCEDURE — 97162 PT EVAL MOD COMPLEX 30 MIN: CPT | Performed by: PHYSICAL THERAPIST

## 2022-08-04 NOTE — THERAPY EVALUATION
Outpatient Physical Therapy Ortho Initial Evaluation  [facility]     Patient Name: Shun Coleman  : 1975  MRN: [id number]  Today's Date: 2022      Visit Date: 2022     Patient seen for 1 PT sessions.  Patient reports N/A% of improvement.  Next MD appt: [facility].  Recertification: N/A    Therapy Diagnosis: DM with neuropathy/Orthotics        Patient Active Problem List   Diagnosis   • Tobacco user   • Essential hypertension   • Mixed hyperlipidemia   • Morbid (severe) obesity due to excess calories (HCC)   • Tobacco abuse counseling   • Chronic anticoagulation   • Longstanding persistent atrial fibrillation (HCC)   • Type 2 diabetes mellitus with diabetic neuropathy, with long-term current use of insulin (HCC)   • Diabetic foot (HCC)   • Bunion   • Encounter for screening for malignant neoplasm of colon        Past Medical History:   Diagnosis Date   • Allergic    • Asthma    • Atrial fibrillation (HCC)    • COPD (chronic obstructive pulmonary disease) (HCC)    • Diabetes mellitus (HCC)    • GERD (gastroesophageal reflux disease)    • Hyperlipidemia    • Hypertension    • Sleep apnea         Past Surgical History:   Procedure Laterality Date   • CARDIAC ABLATION         Visit Dx:     ICD-10-CM ICD-9-CM   1. Type 2 diabetes mellitus with diabetic neuropathy, with long-term current use of insulin (HCC)  E11.40 250.60    Z79.4 357.2     V58.67   2. Diabetic foot (HCC)  E11.8 250.80   3. Bunion  M21.619 727.1   4. Morbid (severe) obesity due to excess calories (HCC)  E66.01 278.01          Patient History     Row Name 22 1400             History    Chief Complaint Pain  -AJ      Type of Pain Foot pain  -AJ      Date Current Problem(s) Began --  Chronic, years  -AJ      Brief Description of Current Complaint patient reports his feet have been giving him trouble for a long time. he reors he has bunsion on the outside of his B feet and PF. he reports standing on his feet for long period of time  "obther him.He rpeorts he has had diabetic shoes before but never the insoles.  -AJ      Patient/Caregiver Goals Relieve pain  -AJ      Current Tobacco Use ~1ppd  -AJ      Smoking Status Daily smoker  -AJ      Patient's Rating of General Health Fair  -AJ      Occupation/sports/leisure activities Occupation: ; Hobbies: \"be lazy\"  -AJ      Patient seeing anyone else for problem(s)? Yes, DPM  -AJ      What clinical tests have you had for this problem? --  -AJ      Results of Clinical Tests --  -AJ      History of Previous Related Injuries None reciently  -AJ              Pain     Pain Location Foot  -AJ      Pain at Present 1  -AJ      Pain at Best 1  -AJ      Pain at Worst 9  -AJ      Pain Frequency Constant/continuous  -AJ      Pain Description --  \"squishy, like I'm walking on balloons\"  -AJ      What Performance Factors Make the Current Problem(s) WORSE? standing, walking a lot  -AJ      What Performance Factors Make the Current Problem(s) BETTER? rest, geting off the feet  -AJ      Is your sleep disturbed? No  -AJ            User Key  (r) = Recorded By, (t) = Taken By, (c) = Cosigned By    Initials Name Provider Type    AJ Breann Horton, PT DPT Physical Therapist                 PT Ortho     Row Name 08/04/22 1400       Subjective Comments    Subjective Comments see history  -AJ       Precautions and Contraindications    Precautions Diabetic  -AJ       Subjective Pain    Able to rate subjective pain? yes  -AJ    Pre-Treatment Pain Level 1  -AJ       Posture/Observations    Alignment Options Foot pronation;Pes Planus  -AJ    Foot pronation Bilateral:;Mild;Decreased  supination  -AJ    Pes Planus Bilateral:;Mild;Decreased  Pes cavus  -AJ    Posture/Observations Comments No distress, wearing worn out B slide sandals.  -AJ       Special Tests/Palpation    Special Tests/Palpation --  Mild R great toe plantar tenderness and mild pressure wound developing  -AJ       General ROM    GENERAL ROM " COMMENTS AROM B ankles and feet WNL.  -AJ       MMT (Manual Muscle Testing)    General MMT Comments Strength B ankles and feet WNL.  -AJ       Sensation    Sensation WNL? WFL  -AJ    Light Touch Partial deficits in the RLE;Partial deficits in the LLE  -AJ       Pathomechanics    Lower Extremity Pathomechanics Antalgic with midstance  R LE  -AJ       Transfers    Comment, (Transfers) I with al ltransfers.  -AJ       Gait/Stairs (Locomotion)    [place] Level (Gait) independent  -AJ    Pattern (Gait) step-through  -AJ    Deviations/Abnormal Patterns (Gait) right sided deviations;antalgic  -AJ          User Key  (r) = Recorded By, (t) = Taken By, (c) = Cosigned By    Initials Name Provider Type    Breann Hawkins, PT DPT Physical Therapist                            Therapy Education  Given: Symptoms/condition management, Pain management, Other (comment) (Shoe wear consultation; POC)  Program: New  How Provided: Verbal  Provided to: Patient  Level of Understanding: Verbalized      PT OP Goals     Row Name 08/04/22 1400          PT Short Term Goals    STG 1 Patient to be molded with B orthotics.  -AJ     STG 1 Progress Met  -AJ     STG 2 Patient to be fit with B orthotics  -AJ     STG 3 Patient to show understanding of proper shoe wear.  -AJ     STG 4 Patient to show understanding of orthotic use and care.  -AJ     STG 5 Patient to show understanding of orthotic wear.  -AJ            Long Term Goals    LTG 1 Patient to verbalize understanding of getting with referring provider if orthotics require adjustments.  -AJ            Time Calculation    PT Goal Re-Cert Due Date --  N/A  -AJ           User Key  (r) = Recorded By, (t) = Taken By, (c) = Cosigned By    Initials Name Provider Type    Breann Hawkins, PT DPT Physical Therapist              Barriers to Rehab: Include significant or possible arthritic/degenerative changes that have occurred within the joints, The chronicity of this issue, The  patient's obesity.      Safety Issues: None noted.        PT Assessment/Plan     Row Name 08/04/22 1400          PT Assessment    Functional Limitations Impaired gait;Limitations in community activities;Performance in leisure activities;Performance in work activities  -     Impairments Peripheral nerve integrity;Pain;Impaired postural alignment  -     Assessment Comments Patient is a 46yo male who presents to the clinic today with complaints of B foot pain. he is a diabetic and suffers from neuropathy. he also has a mild blister/wound starting to form on the R plantar surface of the great toe. patient instructed to inform MD about it at his visit damion few days. Patient had B slipper casts molded today for B custom orthotics to be fabricated by lab according to MD order. Good B slipper cast molds were obtained today.  -     Rehab Potential Good  -     Patient/caregiver participated in establishment of treatment plan and goals Yes  -            PT Plan    PT Frequency --  1+1 = 2 visits  -     Planned CPT's? PT EVAL MOD COMPLELITY: 76634;PT THER SUPP EA 15 MIN  Level 2 orthotics  -     PT Plan Comments Fit and trian with B orthotics.  -           User Key  (r) = Recorded By, (t) = Taken By, (c) = Cosigned By    Initials Name Provider Type     Breann Horton, PT DPT Physical Therapist            Other therapeutic activities and/or exercises will be prescribed depending on the patient's progress or lack thereof.                    Time Calculation:     Start Time: 1425  Stop Time: 1509  Time Calculation (min): 44 min     Therapy Charges for Today     Code Description Service Date Service Provider Modifiers Qty    78866337415 HC PT EVAL MOD COMPLEXITY 2 8/4/2022 Breann Horton, PT DPT GP 1    07173488398 HC PT THER SUPP EA 15 MIN 8/4/2022 Breann Horton, PT DPT GP 1    [id number]  PT-CUSTOM ORTHOTICS-LEVEL 2 8/4/2022 Breann Horton, PT DPT  1                  This document  has been electronically signed by Breann Horton, PT DPT, CSCS on August 4, 2022 15:09 CDT

## 2022-08-12 RX ORDER — INSULIN GLARGINE 100 [IU]/ML
INJECTION, SOLUTION SUBCUTANEOUS
Qty: 15 ML | Refills: 0 | Status: SHIPPED | OUTPATIENT
Start: 2022-08-12 | End: 2022-09-10 | Stop reason: SDUPTHER

## 2022-08-12 RX ORDER — INSULIN ASPART 100 [IU]/ML
INJECTION, SOLUTION INTRAVENOUS; SUBCUTANEOUS
Qty: 15 ML | Refills: 0 | Status: SHIPPED | OUTPATIENT
Start: 2022-08-12 | End: 2022-12-09 | Stop reason: SDUPTHER

## 2022-08-12 NOTE — PROGRESS NOTES
Subjective:  Shun Coleman is a 47 y.o. male who presents for       Patient Active Problem List   Diagnosis   • Tobacco user   • Essential hypertension   • Mixed hyperlipidemia   • Morbid (severe) obesity due to excess calories (HCC)   • Tobacco abuse counseling   • Chronic anticoagulation   • Longstanding persistent atrial fibrillation (HCC)   • Type 2 diabetes mellitus with diabetic neuropathy, with long-term current use of insulin (HCC)   • Diabetic foot (HCC)   • Bunion   • Encounter for screening for malignant neoplasm of colon   • Chronic atrial fibrillation (HCC)   • Gastroesophageal reflux disease           Current Outpatient Medications:   •  atenolol (TENORMIN) 25 MG tablet, Take 1 tablet by mouth 2 (Two) Times a Day., Disp: 60 tablet, Rfl: 3  •  atorvastatin (LIPITOR) 40 MG tablet, Take 1 tablet by mouth Daily., Disp: 30 tablet, Rfl: 3  •  B-D UF III MINI PEN NEEDLES 31G X 5 MM misc, Inject 1 application under the skin into the appropriate area as directed 4 (Four) Times a Day., Disp: 1000 each, Rfl: 3  •  celecoxib (CeleBREX) 200 MG capsule, Take 200 mg by mouth As Needed., Disp: , Rfl:   •  Continuous Blood Gluc Sensor (FreeStyle Laurita 14 Day Sensor) misc, 1 application Every 14 (Fourteen) Days., Disp: 2 each, Rfl: 3  •  Eliquis 5 MG tablet tablet, Take 1 tablet by mouth Daily. (Patient taking differently: Take 5 mg by mouth 2 (Two) Times a Day.), Disp: 30 tablet, Rfl: 3  •  empagliflozin (Jardiance) 25 MG tablet tablet, Take 1 tablet by mouth Daily., Disp: 30 tablet, Rfl: 3  •  FLUTICASONE PROPIONATE, NASAL, NA, 2 sprays into the nostril(s) as directed by provider Daily., Disp: , Rfl:   •  gabapentin (NEURONTIN) 300 MG capsule, Take 1 capsule by mouth 3 (Three) Times a Day. (Patient taking differently: 2 capsules by mouth 3 times per day), Disp: 90 capsule, Rfl: 2  •  gemfibrozil (LOPID) 600 MG tablet, Take 1 tablet by mouth 2 (Two) Times a Day., Disp: 60 tablet, Rfl: 3  •  lisinopril  (PRINIVIL,ZESTRIL) 2.5 MG tablet, Take 1 tablet by mouth Daily., Disp: 30 tablet, Rfl: 3  •  loratadine (CLARITIN) 10 MG tablet, Take 1 tablet by mouth Daily., Disp: 30 tablet, Rfl: 3  •  metFORMIN ER (GLUCOPHAGE-XR) 500 MG 24 hr tablet, Take 1 tablet by mouth 3 (Three) Times a Day., Disp: 90 tablet, Rfl: 3  •  NovoLOG FlexPen 100 UNIT/ML solution pen-injector sc pen, INJECT 20 UNITS SUBCUTANEOUSLY THREE TIMES DAILY WITH MEALS PER  SLIDING  SCALE NO MORE THAN 70 UNITS A DAY, Disp: 15 mL, Rfl: 0  •  pantoprazole (PROTONIX) 20 MG EC tablet, Take 1 tablet by mouth Daily. (Patient taking differently: Take 20 mg by mouth Daily As Needed.), Disp: 30 tablet, Rfl: 3  •  polyethylene glycol (GoLYTELY) 236 g solution, Utilize as directed per instructions received from office of Prince Horton PA-C, Disp: 4000 mL, Rfl: 0  •  Semaglutide (Rybelsus) 3 MG tablet, Take 1 tablet by mouth Daily., Disp: 30 tablet, Rfl: 3  •  Ventolin  (90 Base) MCG/ACT inhaler, Inhale 2 puffs Every 4 (Four) Hours As Needed for Wheezing., Disp: 18 g, Rfl: 3  •  vitamin D (ERGOCALCIFEROL) 1.25 MG (00473 UT) capsule capsule, Take 1 capsule by mouth 1 (One) Time Per Week., Disp: 5 capsule, Rfl: 3  •  insulin glargine (LANTUS, SEMGLEE) 100 UNIT/ML injection, Inject 100 Units under the skin into the appropriate area as directed Daily., Disp: , Rfl:   •  Lantus SoloStar 100 UNIT/ML injection pen, INJECT 110 UNITS SUBCUTANEOUSLY ONCE DAILY AT NIGHT, Disp: 15 mL, Rfl: 0  •  nicotine polacrilex (COMMIT) 4 MG lozenge, Dissolve 1 lozenge in the mouth As Needed for Smoking Cessation. Use every 4 hours PRN. Max is 6 lozenges a day, Disp: 108 lozenge, Rfl: 3    HPI     Pt is 47 yo male with management of HTN, Diabetes HLP, GERD, morbid obesity, tobacco user, diabetic neuropathy, allergic rhinitis, atrial fibrillation sp cardiac ablation, vitamin D deficiency, onychomycosis      6/7/22 pt is here to establish. Pt has a CMH of IDDM type 2 and is on tresiba  injections along with novolog before meals and metformin  mg PO q daily along with jardiance 25 mg daily.  He is on lopid 600 mg and lipitor 40 mg pO qhs  PO q daily for GERD pt is on protonix 20 mg daily. For HTN pt is on atenolol 25 mg PO BID. He is from  [place] and recently moved here to [place].  He is currently unemployed but will work at [employer] soon.  He also has Atrial fibrillation and is on atenolol 25 mg PO BID and eliquis 5 mg daily.  He was seeing CArdiologist in [place] and had an ablation in . He currently smokes about 1 ppd tobacco.  And has been smoking for more than 30 years.  He has tried nicotine patches, gum and lozenges and chantix. He does have a history of heavy alcohol use. He used to use illicit drug including marijuana and cocaine. All of his family members are  including father and mother who both had heart problem and diabetes. Pt has siblings who have passed.  Siblings are .     22 in office visit for recheck. Since last  pt has Canceled appts with Pulmonlogy.  He did see Podiatry on 22 for diabetic foot care onychomycosis and onchogryphosis. His toenails were debrided. He also saw GI on 22 for colonoscopy screening. He was offered EGD for potential Barker's but pt declined.  Pt had labwork done on 22 that showed vitamin D low. Lipid panel showed HDL at 24 hga1c at  9.00 pt was started on rybelsus 3 mg PO q daily.  On CMP GFR at 117 and stable liver enzymes. CBC showed RBC at 6.32 HCT at 53.4 . Pt was 309 lbs last visit and is now at 287 lbs. He has yet to use rybelsus.   Denies any chest pain no dizziness. He has yet to establish with CArdiologist. He was seeing Cardiology in [place] Dr. Doe. He had cardiac ablation in 2019.  He has been in [facility] since.  He is also concerned about sore on bottom of right big toe present for past few weeks. It was black but has changed color to brown. No drainage      Diabetes  He presents for his  follow-up diabetic visit. He has type 2 diabetes mellitus. His disease course has been stable. Pertinent negatives for hypoglycemia include no dizziness or headaches. Associated symptoms include fatigue and weakness. Pertinent negatives for diabetes include no blurred vision, no chest pain, no foot paresthesias, no foot ulcerations, no polydipsia, no polyphagia, no visual change and no weight loss. Pertinent negatives for diabetic complications include no CVA, PVD or retinopathy. Current diabetic treatment includes insulin injections and oral agent (monotherapy). He is compliant with treatment most of the time. He participates in exercise daily. There is no change in his home blood glucose trend. An ACE inhibitor/angiotensin II receptor blocker is not being taken. He does not see a podiatrist.Eye exam is not current.   Hypertension  This is a chronic problem. The current episode started more than 1 month ago. The problem is uncontrolled. Pertinent negatives include no blurred vision, chest pain, headaches, palpitations or shortness of breath. Risk factors for coronary artery disease include diabetes mellitus, male gender, obesity and sedentary lifestyle. Current antihypertension treatment includes nothing. The current treatment provides no improvement. There is no history of angina, kidney disease, CVA, heart failure, left ventricular hypertrophy, PVD or retinopathy. There is no history of chronic renal disease, coarctation of the aorta, hyperaldosteronism, hypercortisolism, hyperparathyroidism, a hypertension causing med, pheochromocytoma, renovascular disease, sleep apnea or a thyroid problem.   Obesity  This is a chronic problem. The current episode started more than 1 year ago. The problem occurs constantly. The problem has been unchanged. Associated symptoms include arthralgias, fatigue, numbness and weakness. Pertinent negatives include no chest pain, chills, congestion, coughing, diaphoresis, fever,  headaches, nausea, sore throat, visual change or vomiting. Nothing aggravates the symptoms. He has tried nothing for the symptoms. The treatment provided no relief.   Atrial Fibrillation  Presents for initial visit. Symptoms include hypertension and weakness. Symptoms are negative for an AICD problem, bradycardia, chest pain, dizziness, hemodynamic instability, hypotension, pacemaker problem, palpitations, shortness of breath, syncope and tachycardia. The symptoms have been stable. Past treatments include anticoagulant. Past medical history includes atrial fibrillation and HTN. There is no history of atrial flutter, AICD, CABG/stent, CAD, CHF, DVT, hyperlipidemia and valvular heart disease.     Review of Systems  Review of Systems   Constitutional: Positive for activity change and fatigue. Negative for appetite change, chills, diaphoresis and fever.   HENT: Negative for congestion, postnasal drip, rhinorrhea, sinus pressure, sinus pain, sneezing, sore throat, trouble swallowing and voice change.    Respiratory: Positive for shortness of breath. Negative for cough, choking, chest tightness, wheezing and stridor.    Cardiovascular: Negative for chest pain.   Gastrointestinal: Negative for diarrhea, nausea and vomiting.   Musculoskeletal: Positive for arthralgias.   Neurological: Positive for weakness and numbness. Negative for headaches.       Patient Active Problem List   Diagnosis   • Tobacco user   • Essential hypertension   • Mixed hyperlipidemia   • Morbid (severe) obesity due to excess calories (HCC)   • Tobacco abuse counseling   • Chronic anticoagulation   • Longstanding persistent atrial fibrillation (HCC)   • Type 2 diabetes mellitus with diabetic neuropathy, with long-term current use of insulin (HCC)   • Diabetic foot (HCC)   • Bunion   • Encounter for screening for malignant neoplasm of colon   • Chronic atrial fibrillation (HCC)   • Gastroesophageal reflux disease     Past Surgical History:   Procedure  "Laterality Date   • CARDIAC ABLATION       Social History     Socioeconomic History   • Marital status:    Tobacco Use   • Smoking status: Current Every Day Smoker     Packs/day: 1.00     Years: 35.00     Pack years: 35.00   • Smokeless tobacco: Former User     Types: Chew   Vaping Use   • Vaping Use: Former   • Substances: Nicotine   • Devices: Refillable tank   Substance and Sexual Activity   • Alcohol use: Not Currently   • Drug use: Not Currently     Types: \"Crack\" cocaine, Marijuana   • Sexual activity: Defer     Comment: .     Family History   Problem Relation Age of Onset   • Heart disease Mother    • Kidney disease Father    • Heart disease Father    • Kidney disease Sister    • Diabetes Other    • Cancer Other    • Stroke Other    • Hypertension Other    • Allergy (severe) Other    • Cholelithiasis Other      Lab on 06/20/2022   Component Date Value Ref Range Status   • WBC 06/20/2022 8.82  3.40 - 10.80 10*3/mm3 Final   • RBC 06/20/2022 6.32 (A) 4.14 - 5.80 10*6/mm3 Final   • Hemoglobin 06/20/2022 17.7  13.0 - 17.7 g/dL Final   • Hematocrit 06/20/2022 53.4 (A) 37.5 - 51.0 % Final   • MCV 06/20/2022 84.5  79.0 - 97.0 fL Final   • MCH 06/20/2022 28.0  26.6 - 33.0 pg Final   • MCHC 06/20/2022 33.1  31.5 - 35.7 g/dL Final   • RDW 06/20/2022 14.3  12.3 - 15.4 % Final   • RDW-SD 06/20/2022 41.1  37.0 - 54.0 fl Final   • MPV 06/20/2022 10.3  6.0 - 12.0 fL Final   • Platelets 06/20/2022 292  140 - 450 10*3/mm3 Final   • Neutrophil % 06/20/2022 56.0  42.7 - 76.0 % Final   • Lymphocyte % 06/20/2022 34.6  19.6 - 45.3 % Final   • Monocyte % 06/20/2022 6.9  5.0 - 12.0 % Final   • Eosinophil % 06/20/2022 1.4  0.3 - 6.2 % Final   • Basophil % 06/20/2022 0.8  0.0 - 1.5 % Final   • Immature Grans % 06/20/2022 0.3  0.0 - 0.5 % Final   • Neutrophils, Absolute 06/20/2022 4.94  1.70 - 7.00 10*3/mm3 Final   • Lymphocytes, Absolute 06/20/2022 3.05  0.70 - 3.10 10*3/mm3 Final   • Monocytes, Absolute 06/20/2022 0.61  " 0.10 - 0.90 10*3/mm3 Final   • Eosinophils, Absolute 06/20/2022 0.12  0.00 - 0.40 10*3/mm3 Final   • Basophils, Absolute 06/20/2022 0.07  0.00 - 0.20 10*3/mm3 Final   • Immature Grans, Absolute 06/20/2022 0.03  0.00 - 0.05 10*3/mm3 Final   • nRBC 06/20/2022 0.0  0.0 - 0.2 /100 WBC Final   • Glucose 06/20/2022 142 (A) 65 - 99 mg/dL Final   • BUN 06/20/2022 10  6 - 20 mg/dL Final   • Creatinine 06/20/2022 0.65 (A) 0.76 - 1.27 mg/dL Final   • Sodium 06/20/2022 138  136 - 145 mmol/L Final   • Potassium 06/20/2022 3.8  3.5 - 5.2 mmol/L Final   • Chloride 06/20/2022 100  98 - 107 mmol/L Final   • CO2 06/20/2022 22.7  22.0 - 29.0 mmol/L Final   • Calcium 06/20/2022 9.0  8.6 - 10.5 mg/dL Final   • Total Protein 06/20/2022 6.9  6.0 - 8.5 g/dL Final   • Albumin 06/20/2022 4.40  3.50 - 5.20 g/dL Final   • ALT (SGPT) 06/20/2022 9  1 - 41 U/L Final   • AST (SGOT) 06/20/2022 13  1 - 40 U/L Final   • Alkaline Phosphatase 06/20/2022 108  39 - 117 U/L Final   • Total Bilirubin 06/20/2022 0.4  0.0 - 1.2 mg/dL Final   • Globulin 06/20/2022 2.5  gm/dL Final   • A/G Ratio 06/20/2022 1.8  g/dL Final   • BUN/Creatinine Ratio 06/20/2022 15.4  7.0 - 25.0 Final   • Anion Gap 06/20/2022 15.3 (A) 5.0 - 15.0 mmol/L Final   • eGFR 06/20/2022 117.0  >60.0 mL/min/1.73 Final    National Kidney Foundation and American Society of Nephrology (ASN) Task Force recommended calculation based on the Chronic Kidney Disease Epidemiology Collaboration (CKD-EPI) equation refit without adjustment for race.   • Hemoglobin A1C 06/20/2022 9.00 (A) 4.80 - 5.60 % Final   • Total Cholesterol 06/20/2022 105  0 - 200 mg/dL Final   • Triglycerides 06/20/2022 142  0 - 150 mg/dL Final   • HDL Cholesterol 06/20/2022 24 (A) 40 - 60 mg/dL Final   • LDL Cholesterol  06/20/2022 56  0 - 100 mg/dL Final   • VLDL Cholesterol 06/20/2022 25  5 - 40 mg/dL Final   • LDL/HDL Ratio 06/20/2022 2.19   Final   • TSH 06/20/2022 3.620  0.270 - 4.200 uIU/mL Final   • Free T4 06/20/2022 1.17   "0.93 - 1.70 ng/dL Final   • 25 Hydroxy, Vitamin D 06/20/2022 10.6 (A) 30.0 - 100.0 ng/ml Final   • Vitamin B-12 06/20/2022 379  211 - 946 pg/mL Final   • Hepatitis C Ab 06/20/2022 Non-Reactive  Non-Reactive Final   • Microalbumin/Creatinine Ratio 06/20/2022 47.1  mg/g Final   • Creatinine, Urine 06/20/2022 74.3  mg/dL Final   • Microalbumin, Urine 06/20/2022 3.5  mg/dL Final      No image results found.    [unfilled]  Immunization History   Administered Date(s) Administered   • COVID-19 (MODERNA) 1st, 2nd, 3rd Dose Only 06/07/2021, 07/12/2021   • COVID-19 (MODERNA) BOOSTER 02/07/2022   • Tdap 05/13/2020       The following portions of the patient's history were reviewed and updated as appropriate: allergies, current medications, past family history, past medical history, past social history, past surgical history and problem list.        Physical Exam  /78 (BP Location: Right arm, Patient Position: Sitting, Cuff Size: Large Adult)   Pulse 80   Temp 97.7 °F (36.5 °C) (Tympanic)   Resp 18   Ht 177.8 cm (70\")   Wt 131 kg (287 lb 12.8 oz)   SpO2 97%   BMI 41.30 kg/m²     Physical Exam  Vitals and nursing note reviewed.   Constitutional:       Appearance: He is well-developed. He is not diaphoretic.   HENT:      Head: Normocephalic and atraumatic.      Right Ear: External ear normal.   Eyes:      Conjunctiva/sclera: Conjunctivae normal.      Pupils: Pupils are equal, round, and reactive to light.   Cardiovascular:      Rate and Rhythm: Normal rate and regular rhythm.      Heart sounds: Normal heart sounds. No murmur heard.  Pulmonary:      Effort: Pulmonary effort is normal. No respiratory distress.      Breath sounds: Normal breath sounds.   Abdominal:      General: Bowel sounds are normal. There is no distension.      Palpations: Abdomen is soft.      Tenderness: There is no abdominal tenderness.      Comments: Obese abdomen    Musculoskeletal:         General: Tenderness present. No deformity. Normal " range of motion.      Cervical back: Normal range of motion and neck supple.        Feet:    Skin:     General: Skin is warm.      Coloration: Skin is not pale.      Findings: No erythema or rash.   Neurological:      Mental Status: He is alert and oriented to person, place, and time.      Cranial Nerves: No cranial nerve deficit.   Psychiatric:         Behavior: Behavior normal.         [unfilled]   Diagnosis Plan   1. Chronic anticoagulation  CBC Auto Differential    Comprehensive Metabolic Panel    Hemoglobin A1c    Lipid Panel    TSH    Vitamin D 25 Hydroxy    Vitamin B12    Ambulatory Referral to Cardiology   2. Mixed hyperlipidemia  CBC Auto Differential    Comprehensive Metabolic Panel    Hemoglobin A1c    Lipid Panel    TSH    Vitamin D 25 Hydroxy    Vitamin B12   3. Essential hypertension  CBC Auto Differential    Comprehensive Metabolic Panel    Hemoglobin A1c    Lipid Panel    TSH    Vitamin D 25 Hydroxy    Vitamin B12   4. Tobacco user  CBC Auto Differential    Comprehensive Metabolic Panel    Hemoglobin A1c    Lipid Panel    TSH    Vitamin D 25 Hydroxy    Vitamin B12    Ambulatory Referral to Pulmonology   5. Chronic atrial fibrillation (HCC)  CBC Auto Differential    Comprehensive Metabolic Panel    Hemoglobin A1c    Lipid Panel    TSH    Vitamin D 25 Hydroxy    Vitamin B12    Ambulatory Referral to Cardiology   6. Gastroesophageal reflux disease, unspecified whether esophagitis present  CBC Auto Differential    Comprehensive Metabolic Panel    Hemoglobin A1c    Lipid Panel    TSH    Vitamin D 25 Hydroxy    Vitamin B12   7. Morbid (severe) obesity due to excess calories (HCC)  CBC Auto Differential    Comprehensive Metabolic Panel    Hemoglobin A1c    Lipid Panel    TSH    Vitamin D 25 Hydroxy    Vitamin B12   8. Uncontrolled type 2 diabetes mellitus with hypoglycemia without coma (HCC)  CBC Auto Differential    Comprehensive Metabolic Panel    Hemoglobin A1c    Lipid Panel    TSH    Vitamin D 25  Hydroxy    Vitamin B12   9. Chronic obstructive pulmonary disease, unspecified COPD type (HCC)  Ambulatory Referral to Pulmonology   10. DANYA on CPAP  Ambulatory Referral to Sleep Medicine   11. History of cardiac radiofrequency ablation  Ambulatory Referral to Cardiology        -went over labowrk   -recommend diabetic eye exam  -recommend COVId-19 vaccination  -recommend colonoscopy screening -pt has pending endoscopy   -recommend Tdap/pneumonia  vaccination  -healing sore  on right big toe - recommend pt followup with Podiatry for this.  Currently no open sore or wound.    -diabetic foot care/onychomycosis - Podiatry following   -multiple joint pain - celebrex.    -atrial fibrillation- on atenolol 25 mg pO BId on elqiuis 5 mg PO q daily.   Refer to Cardiology   -allergic rhinitis - on claritin 10 mg PO q daily.   -HTN-  On atenolol 25 mg PO BID. On lisinopril 2.5 mg daily.   -DM type 2 - on tresiba 110 units , novolog injections on metformin  mg PO q daily on jardiance 25 mg daily.   on rybelsus   -morbid obesity - counseled weight loss >5 minutes BMI at 41.30   -HLP- on lopid 600 mg daily. On lipitor 40 mg PO qhs Check lipid panel recommend heart healthy diet  -COPD- refer to Pulmonology. Advised to quit smoking. Refer to Dr. Bhatti   -DANYA on Cpap - refer to sleep medicine. Will refer to Neha saldaña  -diabetic neuropathy - on neurontin 300 mg PO TId  -tobacco smoker - counseled quit smoking >5 minutes recommend 1 800 QUIT NOW recommend nicotine replacement therapy   -GERD - on protonix 20 mg daily.   -advised pt to be safe and call with questions and concerns  -advised pt to go to ER or call 911 if symptoms worrisome or severe  -advised pt to followup with specialist and referrals  -advised pt to be safe during COVID-19 pandemic  I spent 45  minutes caring for Shun on this date of service. This time includes time spent by me in the following activities: preparing for the visit, reviewing tests,  obtaining and/or reviewing a separately obtained history, performing a medically appropriate examination and/or evaluation, counseling and educating the patient/family/caregiver, ordering medications, tests, or procedures, referring and communicating with other health care professionals, documenting information in the medical record, independently interpreting results and communicating that information with the patient/family/caregiver and care coordination.   -recheck in 2 months         This document has been electronically signed by Prince Tobin MD on August 18, 2022 14:33 CDT

## 2022-08-18 ENCOUNTER — OFFICE VISIT (OUTPATIENT)
Dept: FAMILY MEDICINE CLINIC | Facility: CLINIC | Age: 47
End: 2022-08-18

## 2022-08-18 VITALS
DIASTOLIC BLOOD PRESSURE: 78 MMHG | TEMPERATURE: 97.7 F | HEART RATE: 80 BPM | OXYGEN SATURATION: 97 % | SYSTOLIC BLOOD PRESSURE: 126 MMHG | HEIGHT: 70 IN | BODY MASS INDEX: 41.2 KG/M2 | WEIGHT: 287.8 LBS | RESPIRATION RATE: 18 BRPM

## 2022-08-18 DIAGNOSIS — Z99.89 OSA ON CPAP: ICD-10-CM

## 2022-08-18 DIAGNOSIS — J44.9 CHRONIC OBSTRUCTIVE PULMONARY DISEASE, UNSPECIFIED COPD TYPE: ICD-10-CM

## 2022-08-18 DIAGNOSIS — G47.33 OSA ON CPAP: ICD-10-CM

## 2022-08-18 DIAGNOSIS — I48.20 CHRONIC ATRIAL FIBRILLATION: ICD-10-CM

## 2022-08-18 DIAGNOSIS — E78.2 MIXED HYPERLIPIDEMIA: ICD-10-CM

## 2022-08-18 DIAGNOSIS — Z98.890 HISTORY OF CARDIAC RADIOFREQUENCY ABLATION: ICD-10-CM

## 2022-08-18 DIAGNOSIS — I10 ESSENTIAL HYPERTENSION: ICD-10-CM

## 2022-08-18 DIAGNOSIS — E11.649 UNCONTROLLED TYPE 2 DIABETES MELLITUS WITH HYPOGLYCEMIA WITHOUT COMA: ICD-10-CM

## 2022-08-18 DIAGNOSIS — E66.01 MORBID (SEVERE) OBESITY DUE TO EXCESS CALORIES: ICD-10-CM

## 2022-08-18 DIAGNOSIS — Z79.01 CHRONIC ANTICOAGULATION: Primary | ICD-10-CM

## 2022-08-18 DIAGNOSIS — K21.9 GASTROESOPHAGEAL REFLUX DISEASE, UNSPECIFIED WHETHER ESOPHAGITIS PRESENT: ICD-10-CM

## 2022-08-18 DIAGNOSIS — Z72.0 TOBACCO USER: ICD-10-CM

## 2022-08-18 PROCEDURE — 99214 OFFICE O/P EST MOD 30 MIN: CPT | Performed by: FAMILY MEDICINE

## 2022-08-18 RX ORDER — POLYETHYLENE GLYCOL 3350 17 G
4 POWDER IN PACKET (EA) ORAL AS NEEDED
Qty: 108 LOZENGE | Refills: 3 | Status: SHIPPED | OUTPATIENT
Start: 2022-08-18

## 2022-08-18 NOTE — PATIENT INSTRUCTIONS
[facility]    Will refer to Cardiology Dr. Pedro       Refer to DR. Brandon with  [facility] with lung doctor     Start on rybelsus 3 mg daily

## 2022-09-02 ENCOUNTER — HOSPITAL ENCOUNTER (OUTPATIENT)
Facility: HOSPITAL | Age: 47
Setting detail: HOSPITAL OUTPATIENT SURGERY
Discharge: HOME | End: 2022-09-02
Attending: INTERNAL MEDICINE | Admitting: INTERNAL MEDICINE

## 2022-09-02 ENCOUNTER — ANESTHESIA EVENT (OUTPATIENT)
Dept: GASTROENTEROLOGY | Facility: HOSPITAL | Age: 47
End: 2022-09-02

## 2022-09-02 ENCOUNTER — ANESTHESIA (OUTPATIENT)
Dept: GASTROENTEROLOGY | Facility: HOSPITAL | Age: 47
End: 2022-09-02

## 2022-09-02 VITALS
TEMPERATURE: 97.3 F | HEIGHT: 70 IN | BODY MASS INDEX: 40.94 KG/M2 | DIASTOLIC BLOOD PRESSURE: 64 MMHG | SYSTOLIC BLOOD PRESSURE: 112 MMHG | OXYGEN SATURATION: 96 % | WEIGHT: 286 LBS | RESPIRATION RATE: 19 BRPM | HEART RATE: 68 BPM

## 2022-09-02 DIAGNOSIS — Z12.11 ENCOUNTER FOR SCREENING FOR MALIGNANT NEOPLASM OF COLON: ICD-10-CM

## 2022-09-02 PROCEDURE — 45378 DIAGNOSTIC COLONOSCOPY: CPT | Performed by: INTERNAL MEDICINE

## 2022-09-02 PROCEDURE — 25010000002 PROPOFOL 10 MG/ML EMULSION: Performed by: NURSE ANESTHETIST, CERTIFIED REGISTERED

## 2022-09-02 RX ORDER — DEXTROSE AND SODIUM CHLORIDE 5; .45 G/100ML; G/100ML
30 INJECTION, SOLUTION INTRAVENOUS CONTINUOUS PRN
Status: DISCONTINUED | OUTPATIENT
Start: 2022-09-02 | End: 2022-09-02 | Stop reason: HOSPADM

## 2022-09-02 RX ORDER — PROPOFOL 10 MG/ML
VIAL (ML) INTRAVENOUS AS NEEDED
Status: DISCONTINUED | OUTPATIENT
Start: 2022-09-02 | End: 2022-09-02 | Stop reason: SURG

## 2022-09-02 RX ADMIN — PROPOFOL 30 MG: 10 INJECTION, EMULSION INTRAVENOUS at 11:01

## 2022-09-02 RX ADMIN — PROPOFOL 100 MG: 10 INJECTION, EMULSION INTRAVENOUS at 10:56

## 2022-09-02 RX ADMIN — PROPOFOL 30 MG: 10 INJECTION, EMULSION INTRAVENOUS at 10:59

## 2022-09-02 RX ADMIN — DEXTROSE AND SODIUM CHLORIDE 30 ML/HR: 5; 450 INJECTION, SOLUTION INTRAVENOUS at 10:04

## 2022-09-02 NOTE — H&P
No chief complaint on file.      ENDO PROCEDURE ORDERED: COLON screen   I agree with the current note with no changes in the history.  Subjective    Shun Coleman is a 47 y.o. male. he is being seen for consultation today at the request of Jose Jaffe MD    History of Present Illness    This 47-year-old male was sent for evaluation with colonoscopy by Dr. Tobin who saw the patient to establish with diabetes, hypertension, 2022. Patient currently denies abdominal pain. GERD is well controlled on the Protonix 40 mg daily. He denied nausea, vomiting, diarrhea, constipation, bowels are moving without blood or mucus. Weight has been stable. He has never had endoscopy. He is on Eliquis.     Laboratory 2022 CBC was fairly normal, CMP showed a glucose 142, otherwise normal. A1c was 9.0. He was started on Trulicity. Normal cholesterol, TSH, T4, B12, hepatitis C antibody nonreactive. Vitamin D was low at 10.6.     Patient is a pack a day smoker for 35 years, strongly urged to quit. Denied alcohol or illicit substance use. He has had a cardiac ablation, broken left hand. History of A-fib, asthma, diabetes. Family history of diabetes, unknown cancer, stroke, hypertension, gallstones, stroke, allergies. Father and mother both  of heart, father at age 50, mother age 63. Spouse in good health,  previously. Brother and sister in good health, 6 children in good health.     A/P: Patient due for a screening colonoscopy. Discussed the possibility of EGD given potential for Barker's. He declined. He states he does work 3rd shift and wants to try to do his colonoscopy on a Friday. Last LFT's were normal. Encouraged continued aggressive treatment of his blood sugars. Will plan further pending clinical course and the results of the above.     Thank you very much Dr. Tobin for involving us in the care of your patient. Will keep you informed.       The following portions of the patient's history were  "reviewed and updated as appropriate:   Past Medical History:   Diagnosis Date   • Allergic    • Asthma    • Atrial fibrillation (HCC)    • COPD (chronic obstructive pulmonary disease) (HCC)    • Diabetes mellitus (HCC)    • GERD (gastroesophageal reflux disease)    • Hyperlipidemia    • Hypertension    • Sleep apnea      Past Surgical History:   Procedure Laterality Date   • CARDIAC ABLATION       Family History   Problem Relation Age of Onset   • Heart disease Mother    • Kidney disease Father    • Heart disease Father    • Kidney disease Sister    • Diabetes Other    • Cancer Other    • Stroke Other    • Hypertension Other    • Allergy (severe) Other    • Cholelithiasis Other        No Known Allergies  Social History     Socioeconomic History   • Marital status:    Tobacco Use   • Smoking status: Current Every Day Smoker     Packs/day: 1.00     Years: 35.00     Pack years: 35.00   • Smokeless tobacco: Former User     Types: Chew   Vaping Use   • Vaping Use: Former   • Substances: Nicotine   • Devices: Refillable tank   Substance and Sexual Activity   • Alcohol use: Not Currently   • Drug use: Not Currently     Types: \"Crack\" cocaine, Marijuana   • Sexual activity: Defer     Comment: .     Current Medications:  Prior to Admission medications    Medication Sig Start Date End Date Taking? Authorizing Provider   atenolol (TENORMIN) 25 MG tablet Take 1 tablet by mouth 2 (Two) Times a Day. 6/7/22  Yes Prince Tobin MD   atorvastatin (LIPITOR) 40 MG tablet Take 1 tablet by mouth Daily. 6/7/22  Yes Prince Tobin MD   B-D UF III MINI PEN NEEDLES 31G X 5 MM misc Inject 1 application under the skin into the appropriate area as directed 4 (Four) Times a Day. 6/7/22  Yes Prince Tobin MD   celecoxib (CeleBREX) 200 MG capsule Take 200 mg by mouth As Needed. 3/22/22  Yes Carolynn Colvin MD   Continuous Blood Gluc Sensor (FreeStyle Laurita 14 Day Sensor) misc 1 application Every 14 (Fourteen) Days. " 6/7/22  Yes Prince Tobin MD   Eliquis 5 MG tablet tablet Take 1 tablet by mouth Daily.  Patient taking differently: Take 5 mg by mouth 2 (Two) Times a Day. 6/7/22  Yes Prince Tobin MD   empagliflozin (Jardiance) 25 MG tablet tablet Take 1 tablet by mouth Daily. 6/7/22  Yes Prince Tobin MD   FLUTICASONE PROPIONATE, NASAL, NA 2 sprays into the nostril(s) as directed by provider Daily.   Yes ProviderCarolynn MD   gabapentin (NEURONTIN) 300 MG capsule Take 1 capsule by mouth 3 (Three) Times a Day.  Patient taking differently: 2 capsules by mouth 3 times per day 6/7/22  Yes Prince Tobin MD   gemfibrozil (LOPID) 600 MG tablet Take 1 tablet by mouth 2 (Two) Times a Day. 6/7/22  Yes Prince Tobin MD   insulin glargine (LANTUS, SEMGLEE) 100 UNIT/ML injection Inject 100 Units under the skin into the appropriate area as directed Daily.   Yes ProviderCarolynn MD   lisinopril (PRINIVIL,ZESTRIL) 2.5 MG tablet Take 1 tablet by mouth Daily. 6/7/22  Yes Prince Tobin MD   loratadine (CLARITIN) 10 MG tablet Take 1 tablet by mouth Daily. 6/7/22  Yes Prince Tobin MD   metFORMIN ER (GLUCOPHAGE-XR) 500 MG 24 hr tablet Take 1 tablet by mouth 3 (Three) Times a Day. 6/7/22  Yes Prince Tobin MD   NovoLOG FlexPen 100 UNIT/ML solution pen-injector sc pen Inject 20 Units under the skin into the appropriate area as directed 3 (Three) Times a Day With Meals. No more than 70 units a day Uses sliding scale 6/7/22  Yes Prince Tobin MD   pantoprazole (PROTONIX) 20 MG EC tablet Take 1 tablet by mouth Daily.  Patient taking differently: Take 20 mg by mouth Daily As Needed. 6/7/22  Yes Prince Tobin MD   Semaglutide (Rybelsus) 3 MG tablet Take 1 tablet by mouth Daily. 7/7/22  Yes Prince Tobin MD   Ventolin  (90 Base) MCG/ACT inhaler Inhale 2 puffs Every 4 (Four) Hours As Needed for Wheezing. 6/7/22  Yes Prince Tobin MD   vitamin D (ERGOCALCIFEROL) 1.25 MG (27816 UT) capsule capsule Take 1  "capsule by mouth 1 (One) Time Per Week. 6/21/22  Yes Prince Tobin MD   Insulin Degludec (Tresiba FlexTouch) 200 UNIT/ML solution pen-injector pen injection Inject 110 Units under the skin into the appropriate area as directed Every Night. 6/11/22 7/22/22  Prince Tobin MD     Review of Systems  Review of Systems   Constitutional: Negative for unexpected weight change.   HENT: Negative for trouble swallowing.    Gastrointestinal: Positive for abdominal pain. Negative for abdominal distention, anal bleeding, blood in stool, constipation, diarrhea, nausea, rectal pain and vomiting.          Objective    /66   Pulse 62   Temp 96.8 °F (36 °C)   Resp 20   Ht 177.8 cm (70\")   Wt 130 kg (286 lb)   SpO2 95%   BMI 41.04 kg/m²   Physical Exam  Vitals and nursing note reviewed.   Constitutional:       General: He is not in acute distress.     Appearance: He is well-developed. He is obese.   HENT:      Head: Normocephalic and atraumatic.   Eyes:      Pupils: Pupils are equal, round, and reactive to light.   Cardiovascular:      Rate and Rhythm: Normal rate and regular rhythm.      Heart sounds: Normal heart sounds.   Pulmonary:      Effort: Pulmonary effort is normal.      Breath sounds: Normal breath sounds.   Abdominal:      General: Bowel sounds are normal. There is no distension or abdominal bruit.      Palpations: Abdomen is soft. Abdomen is not rigid. There is no shifting dullness or mass.      Tenderness: There is abdominal tenderness. There is no guarding or rebound.      Hernia: No hernia is present. There is no hernia in the ventral area.   Musculoskeletal:         General: Normal range of motion.      Cervical back: Normal range of motion.   Skin:     General: Skin is warm and dry.   Neurological:      Mental Status: He is alert and oriented to person, place, and time.   Psychiatric:         Behavior: Behavior normal.         Thought Content: Thought content normal.         Judgment: Judgment " normal.       Assessment & Plan      1. Encounter for screening for malignant neoplasm of colon    .   Diagnoses and all orders for this visit:    1. Encounter for screening for malignant neoplasm of colon  -     dextrose 5 % and sodium chloride 0.45 % infusion    Other orders  -     Insert Peripheral IV; Standing  -     Obtain Informed Consent; Standing  -     POC Glucose Once; Standing  -     Obtain Informed Consent  -     POC Glucose Once  -     Insert Peripheral IV        Orders placed during this encounter include:  Orders Placed This Encounter   Procedures   • Obtain Informed Consent     Standing Status:   Standing     Number of Occurrences:   1     Order Specific Question:   Informed Consent Given For     Answer:   COLONOSCOPY   • POC Glucose Once     Prior to Procedure on ALL Diabetic Patients     Standing Status:   Standing     Number of Occurrences:   1     Order Specific Question:   Release to patient     Answer:   Immediate   • Insert Peripheral IV     Standing Status:   Standing     Number of Occurrences:   1       Medications prescribed:  New Medications Ordered This Visit   Medications   • dextrose 5 % and sodium chloride 0.45 % infusion     Discontinued Medications       Reason for Discontinue     Insulin Degludec (Tresiba FlexTouch) 200 UNIT/ML solution pen-injector pen injection    *Therapy completed        Requested Prescriptions     Signed Prescriptions Disp Refills   • polyethylene glycol (GoLYTELY) 236 g solution 4000 mL 0     Sig: Utilize as directed per instructions received from office of Prince Horton PA-C       Review and/or summary of lab tests, radiology, procedures, medications. Review and summary of old records and obtaining of history. The risks and benefits of my recommendations, as well as other treatment options were discussed with the patient today. Questions were answered.    Follow-up: No follow-ups on file.     COLONOSCOPY (N/A)      This document has been electronically  signed by Jose Jaffe MD on September 2, 2022 10:14 CDT      Results for orders placed or performed in visit on 06/20/22   CBC Auto Differential    Specimen: Blood   Result Value Ref Range    WBC 8.82 3.40 - 10.80 10*3/mm3    RBC 6.32 (H) 4.14 - 5.80 10*6/mm3    Hemoglobin 17.7 13.0 - 17.7 g/dL    Hematocrit 53.4 (H) 37.5 - 51.0 %    MCV 84.5 79.0 - 97.0 fL    MCH 28.0 26.6 - 33.0 pg    MCHC 33.1 31.5 - 35.7 g/dL    RDW 14.3 12.3 - 15.4 %    RDW-SD 41.1 37.0 - 54.0 fl    MPV 10.3 6.0 - 12.0 fL    Platelets 292 140 - 450 10*3/mm3    Neutrophil % 56.0 42.7 - 76.0 %    Lymphocyte % 34.6 19.6 - 45.3 %    Monocyte % 6.9 5.0 - 12.0 %    Eosinophil % 1.4 0.3 - 6.2 %    Basophil % 0.8 0.0 - 1.5 %    Immature Grans % 0.3 0.0 - 0.5 %    Neutrophils, Absolute 4.94 1.70 - 7.00 10*3/mm3    Lymphocytes, Absolute 3.05 0.70 - 3.10 10*3/mm3    Monocytes, Absolute 0.61 0.10 - 0.90 10*3/mm3    Eosinophils, Absolute 0.12 0.00 - 0.40 10*3/mm3    Basophils, Absolute 0.07 0.00 - 0.20 10*3/mm3    Immature Grans, Absolute 0.03 0.00 - 0.05 10*3/mm3    nRBC 0.0 0.0 - 0.2 /100 WBC   Hepatitis C Antibody    Specimen: Blood   Result Value Ref Range    Hepatitis C Ab Non-Reactive Non-Reactive   Microalbumin / Creatinine Urine Ratio - Urine, Clean Catch    Specimen: Urine, Clean Catch   Result Value Ref Range    Microalbumin/Creatinine Ratio 47.1 mg/g    Creatinine, Urine 74.3 mg/dL    Microalbumin, Urine 3.5 mg/dL   Vitamin D 25 Hydroxy    Specimen: Blood   Result Value Ref Range    25 Hydroxy, Vitamin D 10.6 (L) 30.0 - 100.0 ng/ml   TSH    Specimen: Blood   Result Value Ref Range    TSH 3.620 0.270 - 4.200 uIU/mL   T4, Free    Specimen: Blood   Result Value Ref Range    Free T4 1.17 0.93 - 1.70 ng/dL   Hemoglobin A1c    Specimen: Blood   Result Value Ref Range    Hemoglobin A1C 9.00 (H) 4.80 - 5.60 %   Vitamin B12    Specimen: Blood   Result Value Ref Range    Vitamin B-12 379 211 - 946 pg/mL   Lipid Panel    Specimen: Blood   Result Value  Ref Range    Total Cholesterol 105 0 - 200 mg/dL    Triglycerides 142 0 - 150 mg/dL    HDL Cholesterol 24 (L) 40 - 60 mg/dL    LDL Cholesterol  56 0 - 100 mg/dL    VLDL Cholesterol 25 5 - 40 mg/dL    LDL/HDL Ratio 2.19    Comprehensive Metabolic Panel    Specimen: Blood   Result Value Ref Range    Glucose 142 (H) 65 - 99 mg/dL    BUN 10 6 - 20 mg/dL    Creatinine 0.65 (L) 0.76 - 1.27 mg/dL    Sodium 138 136 - 145 mmol/L    Potassium 3.8 3.5 - 5.2 mmol/L    Chloride 100 98 - 107 mmol/L    CO2 22.7 22.0 - 29.0 mmol/L    Calcium 9.0 8.6 - 10.5 mg/dL    Total Protein 6.9 6.0 - 8.5 g/dL    Albumin 4.40 3.50 - 5.20 g/dL    ALT (SGPT) 9 1 - 41 U/L    AST (SGOT) 13 1 - 40 U/L    Alkaline Phosphatase 108 39 - 117 U/L    Total Bilirubin 0.4 0.0 - 1.2 mg/dL    Globulin 2.5 gm/dL    A/G Ratio 1.8 g/dL    BUN/Creatinine Ratio 15.4 7.0 - 25.0    Anion Gap 15.3 (H) 5.0 - 15.0 mmol/L    eGFR 117.0 >60.0 mL/min/1.73

## 2022-09-02 NOTE — ANESTHESIA PREPROCEDURE EVALUATION
Anesthesia Evaluation     NPO Solid Status: > 8 hours  NPO Liquid Status: > 2 hours           Airway   Mallampati: II  TM distance: >3 FB  Neck ROM: full  no difficulty expected  Dental    (+) poor dentition    Pulmonary - normal exam   (+) COPD, asthma,sleep apnea,   Cardiovascular - normal exam    (+) hypertension, dysrhythmias, hyperlipidemia,       Neuro/Psych  GI/Hepatic/Renal/Endo    (+) morbid obesity, GERD,  diabetes mellitus,     Musculoskeletal     Abdominal    Substance History      OB/GYN          Other                        Anesthesia Plan    ASA 3     MAC     intravenous induction     Anesthetic plan, risks, benefits, and alternatives have been provided, discussed and informed consent has been obtained with: patient.        CODE STATUS:

## 2022-09-12 RX ORDER — INSULIN GLARGINE 100 [IU]/ML
110 INJECTION, SOLUTION SUBCUTANEOUS NIGHTLY
Qty: 15 ML | Refills: 3 | Status: SHIPPED | OUTPATIENT
Start: 2022-09-12 | End: 2022-12-19 | Stop reason: SDUPTHER

## 2022-09-12 RX ORDER — ALBUTEROL SULFATE 90 UG/1
2 AEROSOL, METERED RESPIRATORY (INHALATION) EVERY 4 HOURS PRN
Qty: 18 G | Refills: 3 | Status: SHIPPED | OUTPATIENT
Start: 2022-09-12 | End: 2022-12-01 | Stop reason: SDUPTHER

## 2022-09-28 ENCOUNTER — OFFICE VISIT (OUTPATIENT)
Dept: FAMILY MEDICINE CLINIC | Facility: CLINIC | Age: 47
End: 2022-09-28

## 2022-09-28 VITALS
OXYGEN SATURATION: 97 % | WEIGHT: 287 LBS | HEIGHT: 70 IN | SYSTOLIC BLOOD PRESSURE: 120 MMHG | HEART RATE: 76 BPM | BODY MASS INDEX: 41.09 KG/M2 | TEMPERATURE: 98.6 F | DIASTOLIC BLOOD PRESSURE: 70 MMHG

## 2022-09-28 DIAGNOSIS — K04.7 DENTAL ABSCESS: Primary | ICD-10-CM

## 2022-09-28 PROCEDURE — 99213 OFFICE O/P EST LOW 20 MIN: CPT | Performed by: NURSE PRACTITIONER

## 2022-09-28 RX ORDER — PENICILLIN V POTASSIUM 500 MG/1
500 TABLET ORAL 4 TIMES DAILY
Qty: 40 TABLET | Refills: 0 | Status: SHIPPED | OUTPATIENT
Start: 2022-09-28 | End: 2022-09-28 | Stop reason: SDUPTHER

## 2022-09-28 RX ORDER — CHLORHEXIDINE GLUCONATE 0.12 MG/ML
15 RINSE ORAL 2 TIMES DAILY
Qty: 473 ML | Refills: 1 | Status: SHIPPED | OUTPATIENT
Start: 2022-09-28 | End: 2022-09-28 | Stop reason: SDUPTHER

## 2022-09-28 RX ORDER — PENICILLIN V POTASSIUM 500 MG/1
500 TABLET ORAL 4 TIMES DAILY
Qty: 40 TABLET | Refills: 0 | Status: SHIPPED | OUTPATIENT
Start: 2022-09-28 | End: 2022-10-19

## 2022-09-28 RX ORDER — CHLORHEXIDINE GLUCONATE 0.12 MG/ML
15 RINSE ORAL 2 TIMES DAILY
Qty: 473 ML | Refills: 1 | Status: SHIPPED | OUTPATIENT
Start: 2022-09-28

## 2022-09-28 NOTE — PROGRESS NOTES
"Chief Complaint  Abscess (Gum X 5 days)    Subjective          Shun Coleman presents to [facility] [place]    History of Present Illness  [facility]    PCP: Dr. Tobin--moved from [place], been seeing since June 2022    CC: \"gum abscess\"    Hx of multiple dental problems in the past, all upper teeth have been extracted, has dentures, but not wearing.  Noted pain/swelling to right lower gums on 9-23.  Doing peroxide, mouth wash and salt water rinses. Reports area busted this AM and has had infection taste, but swelling and pain have improved.  Is on waiting list to see the dentist.  Has had low grade temp of 99. Diabetic, last A1C 9.0.  Abscess  This is a new problem. The current episode started in the past 7 days. The problem occurs constantly. Progression since onset: area opened and drained this AM. Associated symptoms include a fever (99) and swollen glands. Pertinent negatives include no abdominal pain, anorexia, arthralgias, change in bowel habit, chest pain, chills, congestion, coughing, diaphoresis, fatigue, headaches, joint swelling, myalgias, nausea, neck pain, numbness, rash, sore throat, urinary symptoms, vertigo, visual change, vomiting or weakness. Nothing aggravates the symptoms. Treatments tried: mouth rinses. The treatment provided mild relief.       Review of Systems   Constitutional: Positive for fever (99). Negative for appetite change, chills, diaphoresis and fatigue.   HENT: Positive for dental problem and facial swelling. Negative for congestion and sore throat.    Respiratory: Negative.  Negative for cough.    Cardiovascular: Negative.  Negative for chest pain.        Hx of afib, had ablation, on eliquis, referral to cardiology already placed by PCP, awaiting appt--scheduled for him while in office today   Gastrointestinal: Negative.  Negative for abdominal pain, anorexia, change in bowel habit, nausea and vomiting. " "  Genitourinary: Negative.    Musculoskeletal: Negative for arthralgias, joint swelling, myalgias and neck pain.   Skin: Negative.  Negative for rash.   Neurological: Negative for dizziness, vertigo, weakness, numbness and headaches.        Objective   Vital Signs:   /70 (BP Location: Right arm, Patient Position: Sitting, Cuff Size: Large Adult)   Pulse 76   Temp 98.6 °F (37 °C)   Ht 177.8 cm (70\")   Wt 130 kg (287 lb)   SpO2 97%   BMI 41.18 kg/m²       Physical Exam  Vitals and nursing note reviewed.   Constitutional:       General: He is not in acute distress.     Appearance: He is obese. He is not ill-appearing.   HENT:      Head: Normocephalic and atraumatic.      Mouth/Throat:      Dentition: Abnormal dentition. Has dentures (has, but not wearing). Dental tenderness, gingival swelling and dental abscesses present.     Cardiovascular:      Rate and Rhythm: Normal rate and regular rhythm.   Pulmonary:      Effort: Pulmonary effort is normal. No respiratory distress.      Breath sounds: Normal breath sounds. No wheezing, rhonchi or rales.   Musculoskeletal:      Cervical back: Neck supple. Tenderness ( mild) present.   Lymphadenopathy:      Cervical: Cervical adenopathy (right) present.   Skin:     General: Skin is warm and dry.   Neurological:      General: No focal deficit present.      Mental Status: He is alert and oriented to person, place, and time.   Psychiatric:         Mood and Affect: Mood normal.         Thought Content: Thought content normal.          Result Review :     Common labs    Common Labs 6/20/22 6/20/22 6/20/22 6/20/22 6/20/22    0849 0849 0849 0849 0849   Glucose  142 (A)      BUN  10      Creatinine  0.65 (A)      Sodium  138      Potassium  3.8      Chloride  100      Calcium  9.0      Albumin  4.40      Total Bilirubin  0.4      Alkaline Phosphatase  108      AST (SGOT)  13      ALT (SGPT)  9      WBC 8.82       Hemoglobin 17.7       Hematocrit 53.4 (A)       Platelets 292    "    Total Cholesterol   105     Triglycerides   142     HDL Cholesterol   24 (A)     LDL Cholesterol    56     Hemoglobin A1C     9.00 (A)   Microalbumin, Urine    3.5    (A) Abnormal value                      Assessment and Plan    Diagnoses and all orders for this visit:    1. Dental abscess (Primary)  -     Discontinue: penicillin v potassium (VEETID) 500 MG tablet; Take 1 tablet by mouth 4 (Four) Times a Day.  Dispense: 40 tablet; Refill: 0  -     Discontinue: chlorhexidine (Peridex) 0.12 % solution; Apply 15 mL to the mouth or throat 2 (Two) Times a Day.  Dispense: 473 mL; Refill: 1  -     penicillin v potassium (VEETID) 500 MG tablet; Take 1 tablet by mouth 4 (Four) Times a Day.  Dispense: 40 tablet; Refill: 0  -     chlorhexidine (Peridex) 0.12 % solution; Apply 15 mL to the mouth or throat 2 (Two) Times a Day.  Dispense: 473 mL; Refill: 1      Rx for PCN, Peridex provided  Declines rx for viscous lidocaine  Tylenol as needed. Has Celebrex PRN, but only uses sparingly  See Dentist for follow up    RTW: Today    See PCP for routine f/u visit and management of chronic medical conditions      This document has been electronically signed by UBALDO Wilcox on September 28, 2022 14:36 CDT,.

## 2022-10-03 ENCOUNTER — APPOINTMENT (OUTPATIENT)
Dept: PHYSICAL THERAPY | Facility: HOSPITAL | Age: 47
End: 2022-10-03

## 2022-10-12 ENCOUNTER — HOSPITAL ENCOUNTER (OUTPATIENT)
Dept: PHYSICAL THERAPY | Facility: HOSPITAL | Age: 47
Setting detail: THERAPIES SERIES
Discharge: HOME | End: 2022-10-12

## 2022-10-12 DIAGNOSIS — E66.01 MORBID (SEVERE) OBESITY DUE TO EXCESS CALORIES: ICD-10-CM

## 2022-10-12 DIAGNOSIS — M21.619 BUNION: ICD-10-CM

## 2022-10-12 DIAGNOSIS — E11.40 TYPE 2 DIABETES MELLITUS WITH DIABETIC NEUROPATHY, WITH LONG-TERM CURRENT USE OF INSULIN: Primary | ICD-10-CM

## 2022-10-12 DIAGNOSIS — Z79.4 TYPE 2 DIABETES MELLITUS WITH DIABETIC NEUROPATHY, WITH LONG-TERM CURRENT USE OF INSULIN: Primary | ICD-10-CM

## 2022-10-12 DIAGNOSIS — E11.8 DIABETIC FOOT: ICD-10-CM

## 2022-10-12 NOTE — THERAPY DISCHARGE NOTE
Outpatient Physical Therapy Ortho /Discharge Summary  [facility]     Patient Name: Shun Coleman  : 1975  MRN: [id number]  Today's Date: 10/12/2022     Pt seen for 2 PT sessions  Reported Improvement:  N/A %  MD Visit: N/A  Recheck Date: N/A    Therapy Diagnosis:  DM with neuropathy/Orthotics        Visit Date: 10/12/2022    Visit Dx:    ICD-10-CM ICD-9-CM   1. Type 2 diabetes mellitus with diabetic neuropathy, with long-term current use of insulin (HCC)  E11.40 250.60    Z79.4 357.2     V58.67   2. Diabetic foot (HCC)  E11.8 250.80   3. Bunion  M21.619 727.1   4. Morbid (severe) obesity due to excess calories (HCC)  E66.01 278.01       Patient Active Problem List   Diagnosis   • Tobacco user   • Essential hypertension   • Mixed hyperlipidemia   • Morbid (severe) obesity due to excess calories (HCC)   • Tobacco abuse counseling   • Chronic anticoagulation   • Longstanding persistent atrial fibrillation (HCC)   • Type 2 diabetes mellitus with diabetic neuropathy, with long-term current use of insulin (HCC)   • Diabetic foot (HCC)   • Bunion   • Encounter for screening for malignant neoplasm of colon   • Chronic atrial fibrillation (HCC)   • Gastroesophageal reflux disease        Past Medical History:   Diagnosis Date   • Allergic    • Asthma    • Atrial fibrillation (HCC)    • COPD (chronic obstructive pulmonary disease) (HCC)    • Diabetes mellitus (HCC)    • GERD (gastroesophageal reflux disease)    • Hyperlipidemia    • Hypertension    • Sleep apnea         Past Surgical History:   Procedure Laterality Date   • CARDIAC ABLATION     • COLONOSCOPY N/A 2022    Procedure: COLONOSCOPY;  Surgeon: Jose Jaffe MD;  Location: [facility] ENDOSCOPY;  Service: Gastroenterology;  Laterality: N/A;                        PT Assessment/Plan     Row Name 10/12/22 1500          PT Assessment    Assessment Comments Pt seen for fitting of custom molded orthotics.  Pt instructed in proper wear schedule,  skin checks, care routine and proper shoe wear.  Pt verbalizes good understanding and was provided written instructions.  Pt is dc'd with all goals met.  -           User Key  (r) = Recorded By, (t) = Taken By, (c) = Cosigned By    Initials Name Provider Type    Dnona Milton PTA Physical Therapist Assistant                                        PT OP Goals     Row Name 10/12/22 1500          PT Short Term Goals    STG 1 Patient to be molded with B orthotics.  -     STG 1 Progress Met  -     STG 2 Patient to be fit with B orthotics  -     STG 2 Progress Met  -     STG 3 Patient to show understanding of proper shoe wear.  -     STG 3 Progress Met  -     STG 4 Patient to show understanding of orthotic use and care.  -     STG 4 Progress Met  -     STG 5 Patient to show understanding of orthotic wear.  -     STG 5 Progress Met  -        Long Term Goals    LTG 1 Patient to verbalize understanding of getting with referring provider if orthotics require adjustments.  -     LTG 1 Progress Met  -           User Key  (r) = Recorded By, (t) = Taken By, (c) = Cosigned By    Initials Name Provider Type    Donna Milton PTA Physical Therapist Assistant                Therapy Education  Education Details: Fit, wear and care for B orthotics.              Time Calculation:      Therapy Charges for Today     Code Description Service Date Service Provider Modifiers Qty    48877353278 HC PT THER SUPP EA 15 MIN 10/12/2022 Donna Go PTA GP, CQ 1                OP PT Discharge Summary  Date of Discharge: 10/12/22  Reason for Discharge: All goals achieved  Outcomes Achieved: Able to achieve all goals within established timeline  Discharge Destination: Home without follow-up      Donna Go PTA  10/12/2022

## 2022-10-19 ENCOUNTER — OFFICE VISIT (OUTPATIENT)
Dept: FAMILY MEDICINE CLINIC | Facility: CLINIC | Age: 47
End: 2022-10-19

## 2022-10-19 ENCOUNTER — TELEPHONE (OUTPATIENT)
Dept: FAMILY MEDICINE CLINIC | Facility: CLINIC | Age: 47
End: 2022-10-19

## 2022-10-19 VITALS
DIASTOLIC BLOOD PRESSURE: 62 MMHG | SYSTOLIC BLOOD PRESSURE: 122 MMHG | WEIGHT: 279.6 LBS | HEIGHT: 70 IN | HEART RATE: 76 BPM | TEMPERATURE: 98.5 F | BODY MASS INDEX: 40.03 KG/M2 | OXYGEN SATURATION: 96 %

## 2022-10-19 DIAGNOSIS — Z79.01 CHRONIC ANTICOAGULATION: ICD-10-CM

## 2022-10-19 DIAGNOSIS — Z72.0 TOBACCO USER: ICD-10-CM

## 2022-10-19 DIAGNOSIS — Z79.4 TYPE 2 DIABETES MELLITUS WITH DIABETIC NEUROPATHY, WITH LONG-TERM CURRENT USE OF INSULIN: ICD-10-CM

## 2022-10-19 DIAGNOSIS — E11.40 TYPE 2 DIABETES MELLITUS WITH DIABETIC NEUROPATHY, WITH LONG-TERM CURRENT USE OF INSULIN: ICD-10-CM

## 2022-10-19 DIAGNOSIS — Z23 NEED FOR IMMUNIZATION AGAINST INFLUENZA: ICD-10-CM

## 2022-10-19 DIAGNOSIS — E78.2 MIXED HYPERLIPIDEMIA: ICD-10-CM

## 2022-10-19 DIAGNOSIS — I10 ESSENTIAL HYPERTENSION: ICD-10-CM

## 2022-10-19 DIAGNOSIS — E11.40 TYPE 2 DIABETES MELLITUS WITH DIABETIC NEUROPATHY, WITHOUT LONG-TERM CURRENT USE OF INSULIN: Primary | ICD-10-CM

## 2022-10-19 DIAGNOSIS — E11.649 UNCONTROLLED TYPE 2 DIABETES MELLITUS WITH HYPOGLYCEMIA WITHOUT COMA: ICD-10-CM

## 2022-10-19 DIAGNOSIS — E66.01 MORBID (SEVERE) OBESITY DUE TO EXCESS CALORIES: ICD-10-CM

## 2022-10-19 DIAGNOSIS — K21.9 GASTROESOPHAGEAL REFLUX DISEASE, UNSPECIFIED WHETHER ESOPHAGITIS PRESENT: ICD-10-CM

## 2022-10-19 DIAGNOSIS — I48.20 CHRONIC ATRIAL FIBRILLATION: ICD-10-CM

## 2022-10-19 PROCEDURE — 90686 IIV4 VACC NO PRSV 0.5 ML IM: CPT | Performed by: FAMILY MEDICINE

## 2022-10-19 PROCEDURE — 99214 OFFICE O/P EST MOD 30 MIN: CPT | Performed by: FAMILY MEDICINE

## 2022-10-19 PROCEDURE — 90471 IMMUNIZATION ADMIN: CPT | Performed by: FAMILY MEDICINE

## 2022-10-19 RX ORDER — GABAPENTIN 300 MG/1
300 CAPSULE ORAL 3 TIMES DAILY
Qty: 90 CAPSULE | Refills: 2 | Status: SHIPPED | OUTPATIENT
Start: 2022-10-19 | End: 2022-10-19

## 2022-10-19 RX ORDER — GABAPENTIN 600 MG/1
600 TABLET ORAL 3 TIMES DAILY
Qty: 90 TABLET | Refills: 2 | Status: SHIPPED | OUTPATIENT
Start: 2022-10-19 | End: 2023-01-27

## 2022-10-19 NOTE — TELEPHONE ENCOUNTER
[facility] pharmacy called needing to clarify if patient is taking 300 or 600 mg of gabapentin as they received scripts for both.  Call back number: [phone]

## 2022-10-19 NOTE — PROGRESS NOTES
Injection  Injection performed in Right Deltoid by Ifrah Cortes MA. Patient tolerated the procedure well without complications.  10/19/22   Ifrah Cortes MA

## 2022-10-20 ENCOUNTER — OFFICE VISIT (OUTPATIENT)
Dept: CARDIOLOGY | Facility: CLINIC | Age: 47
End: 2022-10-20

## 2022-10-20 VITALS
OXYGEN SATURATION: 96 % | SYSTOLIC BLOOD PRESSURE: 108 MMHG | HEIGHT: 70 IN | BODY MASS INDEX: 40.26 KG/M2 | WEIGHT: 281.2 LBS | HEART RATE: 68 BPM | DIASTOLIC BLOOD PRESSURE: 60 MMHG

## 2022-10-20 DIAGNOSIS — E66.01 MORBID (SEVERE) OBESITY DUE TO EXCESS CALORIES: ICD-10-CM

## 2022-10-20 DIAGNOSIS — I48.20 CHRONIC ATRIAL FIBRILLATION: ICD-10-CM

## 2022-10-20 DIAGNOSIS — Z79.01 CHRONIC ANTICOAGULATION: ICD-10-CM

## 2022-10-20 DIAGNOSIS — I10 ESSENTIAL HYPERTENSION: Primary | ICD-10-CM

## 2022-10-20 DIAGNOSIS — E78.2 MIXED HYPERLIPIDEMIA: ICD-10-CM

## 2022-10-20 PROCEDURE — 99204 OFFICE O/P NEW MOD 45 MIN: CPT | Performed by: INTERNAL MEDICINE

## 2022-10-20 NOTE — PROGRESS NOTES
Shun Coleman  47 y.o. male    10/20/2022  1. Essential hypertension    2. Chronic atrial fibrillation (HCC)    3. Morbid (severe) obesity due to excess calories (HCC)    4. Mixed hyperlipidemia    5. Chronic anticoagulation        History of Present Illness  Shun Coleman is a 47-year-old male who is being seen by me for the first time as referred by .  His history is remarkable for multiple medical issues which include longstanding hypertension, diabetes mellitus, morbid obesity, hyperlipidemia, GERD, atrial fibrillation for which he underwent pulmonary vein isolation about 5 to 6 years prior at [place].  He recently moved to [place] and establish with primary care.  His other medical issues include diabetic neuropathy, vitamin D deficiency, hemorrhoids, GERD, sleep apnea on CPAP.    The patient indicates that after his ablation he has had no recurrence of atrial fibrillation and has been maintained on beta-blockers and anticoagulation with Eliquis.  He has no previous documented coronary artery disease or valvular heart disease.  He claims compliant with medicines.    The patient does smoke up to a pack of cigarettes per day for the past 35 years and denied any alcohol or drug use at this time.  He has no history of thyroid problems.    EKG today showed sinus rhythm with heart rate of 68 bpm.  Rightward axis.  QTc interval 433 ms.    SUBJECTIVE    No Known Allergies      Past Medical History:   Diagnosis Date   • Allergic    • Asthma    • Atrial fibrillation (HCC)    • COPD (chronic obstructive pulmonary disease) (HCC)    • Diabetes mellitus (HCC)    • GERD (gastroesophageal reflux disease)    • Hyperlipidemia    • Hypertension    • Sleep apnea          Past Surgical History:   Procedure Laterality Date   • CARDIAC ABLATION     • COLONOSCOPY N/A 9/2/2022    Procedure: COLONOSCOPY;  Surgeon: Jose Jaffe MD;  Location: [facility] ENDOSCOPY;  Service: Gastroenterology;  Laterality: N/A;  "        Family History   Problem Relation Age of Onset   • Heart disease Mother    • Kidney disease Father    • Heart disease Father    • Kidney disease Sister    • Diabetes Other    • Cancer Other    • Stroke Other    • Hypertension Other    • Allergy (severe) Other    • Cholelithiasis Other          Social History     Socioeconomic History   • Marital status:    Tobacco Use   • Smoking status: Every Day     Packs/day: 1.00     Years: 35.00     Pack years: 35.00     Types: Cigarettes   • Smokeless tobacco: Former     Types: Chew   Vaping Use   • Vaping Use: Former   • Substances: Nicotine   • Devices: [employer]Importble tank   Substance and Sexual Activity   • Alcohol use: Not Currently   • Drug use: Not Currently     Types: \"Crack\" cocaine, Marijuana   • Sexual activity: Defer     Comment: .         Current Outpatient Medications   Medication Sig Dispense Refill   • atenolol (TENORMIN) 25 MG tablet Take 1 tablet by mouth 2 (Two) Times a Day. 60 tablet 3   • atorvastatin (LIPITOR) 40 MG tablet Take 1 tablet by mouth Daily. 30 tablet 3   • B-D UF III MINI PEN NEEDLES 31G X 5 MM misc Inject 1 application under the skin into the appropriate area as directed 4 (Four) Times a Day. 1000 each 3   • celecoxib (CeleBREX) 200 MG capsule Take 200 mg by mouth As Needed.     • chlorhexidine (Peridex) 0.12 % solution Apply 15 mL to the mouth or throat 2 (Two) Times a Day. 473 mL 1   • Continuous Blood Gluc Sensor (FreeStyle Laurita 14 Day Sensor) misc 1 application Every 14 (Fourteen) Days. 2 each 3   • Eliquis 5 MG tablet tablet Take 1 tablet by mouth Daily. (Patient taking differently: Take 1 tablet by mouth 2 (Two) Times a Day.) 30 tablet 3   • empagliflozin (Jardiance) 25 MG tablet tablet Take 1 tablet by mouth Daily. 30 tablet 3   • FLUTICASONE PROPIONATE, NASAL, NA 2 sprays into the nostril(s) as directed by provider Daily.     • gabapentin (Neurontin) 600 MG tablet Take 1 tablet by mouth 3 (Three) Times a Day. 90 " "tablet 2   • gemfibrozil (LOPID) 600 MG tablet Take 1 tablet by mouth 2 (Two) Times a Day. 60 tablet 3   • Insulin Glargine (Lantus SoloStar) 100 UNIT/ML injection pen Inject 110 Units under the skin into the appropriate area as directed Every Night. 15 mL 3   • lisinopril (PRINIVIL,ZESTRIL) 2.5 MG tablet Take 1 tablet by mouth Daily. 30 tablet 3   • loratadine (CLARITIN) 10 MG tablet Take 1 tablet by mouth Daily. 30 tablet 3   • metFORMIN ER (GLUCOPHAGE-XR) 500 MG 24 hr tablet Take 1 tablet by mouth 3 (Three) Times a Day. 90 tablet 3   • nicotine polacrilex (COMMIT) 4 MG lozenge Dissolve 1 lozenge in the mouth As Needed for Smoking Cessation. Use every 4 hours PRN. Max is 6 lozenges a day 108 lozenge 3   • NovoLOG FlexPen 100 UNIT/ML solution pen-injector sc pen INJECT 20 UNITS SUBCUTANEOUSLY THREE TIMES DAILY WITH MEALS PER  SLIDING  SCALE NO MORE THAN 70 UNITS A DAY 15 mL 0   • pantoprazole (PROTONIX) 20 MG EC tablet Take 1 tablet by mouth Daily. (Patient taking differently: Take 1 tablet by mouth Daily As Needed.) 30 tablet 3   • Semaglutide (Rybelsus) 3 MG tablet Take 1 tablet by mouth Daily. 30 tablet 3   • Ventolin  (90 Base) MCG/ACT inhaler Inhale 2 puffs Every 4 (Four) Hours As Needed for Wheezing. 18 g 3   • vitamin D (ERGOCALCIFEROL) 1.25 MG (29933 UT) capsule capsule Take 1 capsule by mouth 1 (One) Time Per Week. 5 capsule 3     No current facility-administered medications for this visit.         OBJECTIVE    /60 (BP Location: Right arm, Patient Position: Sitting, Cuff Size: Adult) Comment (BP Location): Monitor on left arm  Pulse 68   Ht 177.8 cm (70\")   Wt 128 kg (281 lb 3.2 oz)   SpO2 96%   BMI 40.35 kg/m²         Review of Systems     Constitutional:  Denies recent weight loss, weight gain, fever or chills, no change in exercise tolerance     HENT:  Denies any hearing loss, epistaxis, hoarseness, or difficulty speaking.     Eyes: Wears eyeglasses or contact lenses     Respiratory:  " Denies dyspnea with exertion, no cough, wheezing, or hemoptysis.  History of sleep apnea    Cardiovascular: Negative for palpitations, chest pain, orthopnea, PND.  See HPI    Gastrointestinal: GERD, no ulcer disease, hematochezia, or melena.     Endocrine: Negative for cold intolerance, heat intolerance, polydipsia, polyphagia and polyuria.  Diabetes mellitus, hyperlipidemia    Genitourinary: Negative.      Musculoskeletal: History of diabetic foot, bunion    Skin:  Denies any change in hair or nails, rashes, or skin lesions.     Allergic/Immunologic: Negative.  Negative for environmental allergies, food allergies and/or immunocompromised state.     Neurological:  Denies any history of recurrent headaches, strokes, TIA, or seizure disorder.     Hematological: Denies any food allergies, seasonal allergies, bleeding disorders, or lymphadenopathy.     Psychiatric/Behavioral: Denies any history of depression, substance abuse, or change in cognitive function.       Physical Exam      Constitutional: Cooperative, alert and oriented, in no acute distress.     HENT:   Head: Normocephalic, normal hair patterns, no masses or tenderness.  Ears, Nose, and Throat: No gross abnormalities. No pallor or cyanosis. Dentition good.   Eyes: EOMS intact, PERRL, conjunctivae and lids unremarkable. Fundoscopic exam and visual fields not performed.   Neck: No palpable masses or adenopathy, no thyromegaly, no JVD, carotid pulses are full and equal bilaterally and without bruit.     Cardiovascular: Regular rhythm, S1 and S2 normal, no S3 or S4.  No murmurs, gallops, or rubs detected.     Pulmonary/Chest: Chest: normal symmetry, normal respiratory excursion, no intercostal retraction, no use of accessory muscles.     Pulmonary: Normal breath sounds. No rales or rhonchi.    Abdominal: Abdomen soft, bowel sounds normoactive, no masses, no hepatosplenomegaly, nontender, no bruit.     Musculoskeletal: No deformities, clubbing, cyanosis, erythema,  or edema observed.     Neurological: No gross motor or sensory deficits noted, affect appropriate, oriented to time, person, place.     Skin: Warm and dry to the touch, no apparent skin lesion or mass noted.     Psychiatric: He has a normal mood and affect. His behavior is normal. Judgment and thought content normal.         Procedures      Lab Results   Component Value Date    WBC 8.82 06/20/2022    HGB 17.7 06/20/2022    HCT 53.4 (H) 06/20/2022    MCV 84.5 06/20/2022     06/20/2022     Lab Results   Component Value Date    GLUCOSE 142 (H) 06/20/2022    BUN 10 06/20/2022    CREATININE 0.65 (L) 06/20/2022    BCR 15.4 06/20/2022    CO2 22.7 06/20/2022    CALCIUM 9.0 06/20/2022    ALBUMIN 4.40 06/20/2022    AST 13 06/20/2022    ALT 9 06/20/2022     Lab Results   Component Value Date    CHOL 105 06/20/2022     Lab Results   Component Value Date    TRIG 142 06/20/2022     Lab Results   Component Value Date    HDL 24 (L) 06/20/2022     No components found for: LDLCALC  Lab Results   Component Value Date    LDL 56 06/20/2022     No results found for: HDLLDLRATIO  No components found for: CHOLHDL  Lab Results   Component Value Date    HGBA1C 9.00 (H) 06/20/2022     Lab Results   Component Value Date    TSH 3.620 06/20/2022           ASSESSMENT AND PLAN  Shun Coleman is a 47-year-old male with multiple medical issues as discussed in detail under history of present illness including morbid obesity, hypertension, diabetes mellitus, hyperlipidemia, sleep apnea, GERD, atrial fibrillation status post pulmonary vein isolation.    He remains in sinus rhythm with no clinical evidence of angina, arrhythmia or congestive heart failure at this time.  His lipid profiles are in the normal range and LDL was 56 and HDL 24 in June 2022.  His diabetes has not been under good control and we had a discussion about appropriate diet, activity.  Smoking cessation was stressed.    After reviewing his medicines have continued atenolol for  control of heart rate/blood pressure, anticoagulation with Eliquis.  Antihypertensive therapy with lisinopril, atenolol, lipid-lowering therapy with atorvastatin, gemfibrozil have been continued.  Aggressive risk factor modification recommended.    An echocardiogram to assess left ventricular and valvular function and right heart pressures have been arranged.  Thank you for asked me to see this patient.    Diagnoses and all orders for this visit:    1. Essential hypertension (Primary)  -     ECG 12 Lead  -     Adult Transthoracic Echo Complete w/ Color, Spectral and Contrast if Necessary Per Protocol; Future    2. Chronic atrial fibrillation (HCC)  -     Adult Transthoracic Echo Complete w/ Color, Spectral and Contrast if Necessary Per Protocol; Future    3. Morbid (severe) obesity due to excess calories (HCC)  -     Adult Transthoracic Echo Complete w/ Color, Spectral and Contrast if Necessary Per Protocol; Future    4. Mixed hyperlipidemia  -     Adult Transthoracic Echo Complete w/ Color, Spectral and Contrast if Necessary Per Protocol; Future    5. Chronic anticoagulation  -     Adult Transthoracic Echo Complete w/ Color, Spectral and Contrast if Necessary Per Protocol; Future        Class 3 Severe Obesity (BMI >=40). Obesity-related health conditions include the following: obstructive sleep apnea, hypertension, diabetes mellitus, dyslipidemias and GERD. Obesity is unchanged. BMI is is above average; BMI management plan is completed. We discussed portion control and increasing exercise.      Shun Coleman  reports that he has been smoking cigarettes. He has a 35.00 pack-year smoking history. He has quit using smokeless tobacco.  His smokeless tobacco use included chew.. I have educated him on the risk of diseases from using tobacco products such as cancer, COPD and heart disease.       I spent 3  minutes counseling the patient.               Joleen Amaya MD  10/20/2022  14:18 CDT

## 2022-10-24 ENCOUNTER — LAB (OUTPATIENT)
Dept: LAB | Facility: HOSPITAL | Age: 47
End: 2022-10-24

## 2022-10-24 DIAGNOSIS — Z79.01 CHRONIC ANTICOAGULATION: ICD-10-CM

## 2022-10-24 DIAGNOSIS — E11.649 UNCONTROLLED TYPE 2 DIABETES MELLITUS WITH HYPOGLYCEMIA WITHOUT COMA: ICD-10-CM

## 2022-10-24 DIAGNOSIS — K21.9 GASTROESOPHAGEAL REFLUX DISEASE, UNSPECIFIED WHETHER ESOPHAGITIS PRESENT: ICD-10-CM

## 2022-10-24 DIAGNOSIS — Z72.0 TOBACCO USER: ICD-10-CM

## 2022-10-24 DIAGNOSIS — E78.2 MIXED HYPERLIPIDEMIA: ICD-10-CM

## 2022-10-24 DIAGNOSIS — I10 ESSENTIAL HYPERTENSION: ICD-10-CM

## 2022-10-24 DIAGNOSIS — E66.01 MORBID (SEVERE) OBESITY DUE TO EXCESS CALORIES: ICD-10-CM

## 2022-10-24 DIAGNOSIS — I48.20 CHRONIC ATRIAL FIBRILLATION: ICD-10-CM

## 2022-10-24 LAB
25(OH)D3 SERPL-MCNC: 13.9 NG/ML (ref 30–100)
ALBUMIN SERPL-MCNC: 4.3 G/DL (ref 3.5–5.2)
ALBUMIN/GLOB SERPL: 1.4 G/DL
ALP SERPL-CCNC: 108 U/L (ref 39–117)
ALT SERPL W P-5'-P-CCNC: 18 U/L (ref 1–41)
ANION GAP SERPL CALCULATED.3IONS-SCNC: 11.1 MMOL/L (ref 5–15)
AST SERPL-CCNC: 18 U/L (ref 1–40)
BASOPHILS # BLD AUTO: 0.09 10*3/MM3 (ref 0–0.2)
BASOPHILS NFR BLD AUTO: 0.9 % (ref 0–1.5)
BILIRUB SERPL-MCNC: 0.4 MG/DL (ref 0–1.2)
BUN SERPL-MCNC: 17 MG/DL (ref 6–20)
BUN/CREAT SERPL: 23.3 (ref 7–25)
CALCIUM SPEC-SCNC: 9.2 MG/DL (ref 8.6–10.5)
CHLORIDE SERPL-SCNC: 100 MMOL/L (ref 98–107)
CHOLEST SERPL-MCNC: 119 MG/DL (ref 0–200)
CO2 SERPL-SCNC: 27.9 MMOL/L (ref 22–29)
CREAT SERPL-MCNC: 0.73 MG/DL (ref 0.76–1.27)
DEPRECATED RDW RBC AUTO: 41.6 FL (ref 37–54)
EGFRCR SERPLBLD CKD-EPI 2021: 112.2 ML/MIN/1.73
EOSINOPHIL # BLD AUTO: 0.16 10*3/MM3 (ref 0–0.4)
EOSINOPHIL NFR BLD AUTO: 1.6 % (ref 0.3–6.2)
ERYTHROCYTE [DISTWIDTH] IN BLOOD BY AUTOMATED COUNT: 14.3 % (ref 12.3–15.4)
GLOBULIN UR ELPH-MCNC: 3 GM/DL
GLUCOSE SERPL-MCNC: 120 MG/DL (ref 65–99)
HBA1C MFR BLD: 8.3 % (ref 4.8–5.6)
HCT VFR BLD AUTO: 52.9 % (ref 37.5–51)
HDLC SERPL-MCNC: 29 MG/DL (ref 40–60)
HGB BLD-MCNC: 18.7 G/DL (ref 13–17.7)
IMM GRANULOCYTES # BLD AUTO: 0.05 10*3/MM3 (ref 0–0.05)
IMM GRANULOCYTES NFR BLD AUTO: 0.5 % (ref 0–0.5)
LDLC SERPL CALC-MCNC: 55 MG/DL (ref 0–100)
LDLC/HDLC SERPL: 1.6 {RATIO}
LYMPHOCYTES # BLD AUTO: 3.78 10*3/MM3 (ref 0.7–3.1)
LYMPHOCYTES NFR BLD AUTO: 37 % (ref 19.6–45.3)
MCH RBC QN AUTO: 29.3 PG (ref 26.6–33)
MCHC RBC AUTO-ENTMCNC: 35.3 G/DL (ref 31.5–35.7)
MCV RBC AUTO: 82.9 FL (ref 79–97)
MONOCYTES # BLD AUTO: 0.77 10*3/MM3 (ref 0.1–0.9)
MONOCYTES NFR BLD AUTO: 7.5 % (ref 5–12)
NEUTROPHILS NFR BLD AUTO: 5.36 10*3/MM3 (ref 1.7–7)
NEUTROPHILS NFR BLD AUTO: 52.5 % (ref 42.7–76)
NRBC BLD AUTO-RTO: 0.1 /100 WBC (ref 0–0.2)
PLATELET # BLD AUTO: 304 10*3/MM3 (ref 140–450)
PMV BLD AUTO: 10.5 FL (ref 6–12)
POTASSIUM SERPL-SCNC: 5 MMOL/L (ref 3.5–5.2)
PROT SERPL-MCNC: 7.3 G/DL (ref 6–8.5)
RBC # BLD AUTO: 6.38 10*6/MM3 (ref 4.14–5.8)
SODIUM SERPL-SCNC: 139 MMOL/L (ref 136–145)
TRIGL SERPL-MCNC: 218 MG/DL (ref 0–150)
TSH SERPL DL<=0.05 MIU/L-ACNC: 3.27 UIU/ML (ref 0.27–4.2)
VIT B12 BLD-MCNC: 283 PG/ML (ref 211–946)
VLDLC SERPL-MCNC: 35 MG/DL (ref 5–40)
WBC NRBC COR # BLD: 10.21 10*3/MM3 (ref 3.4–10.8)

## 2022-10-24 PROCEDURE — 85025 COMPLETE CBC W/AUTO DIFF WBC: CPT

## 2022-10-24 PROCEDURE — 80061 LIPID PANEL: CPT

## 2022-10-24 PROCEDURE — 82306 VITAMIN D 25 HYDROXY: CPT

## 2022-10-24 PROCEDURE — 82607 VITAMIN B-12: CPT

## 2022-10-24 PROCEDURE — 83036 HEMOGLOBIN GLYCOSYLATED A1C: CPT

## 2022-10-24 PROCEDURE — 80053 COMPREHEN METABOLIC PANEL: CPT

## 2022-10-24 PROCEDURE — 84443 ASSAY THYROID STIM HORMONE: CPT

## 2022-10-27 RX ORDER — FLASH GLUCOSE SENSOR
1 KIT MISCELLANEOUS
Qty: 2 EACH | Refills: 3 | Status: SHIPPED | OUTPATIENT
Start: 2022-10-27 | End: 2022-12-01 | Stop reason: SDUPTHER

## 2022-11-01 RX ORDER — ICOSAPENT ETHYL 1000 MG/1
2 CAPSULE ORAL 2 TIMES DAILY WITH MEALS
Qty: 120 CAPSULE | Refills: 3 | Status: SHIPPED | OUTPATIENT
Start: 2022-11-01 | End: 2022-12-01 | Stop reason: SDUPTHER

## 2022-11-01 RX ORDER — CHOLECALCIFEROL (VITAMIN D3) 125 MCG
500 CAPSULE ORAL DAILY
Qty: 30 TABLET | Refills: 3 | Status: SHIPPED | OUTPATIENT
Start: 2022-11-01 | End: 2022-12-01 | Stop reason: SDUPTHER

## 2022-11-01 NOTE — TELEPHONE ENCOUNTER
----- Message from Prince Tobin MD sent at 10/25/2022 11:57 AM CDT -----  Please call pt    Hga1c slightly improved from 9.00 to 8.30 if pt is taking ryebelus and tolerating it recommend going up from 3 to 7 mg daily give 30 pills and 3 refills    Vitamin D continues to be low and recommend pt continue on vitamin D supplement     Vitamin b12 low normal and recommend pt start on vitamin B12 supplement daily give 500 mcg PO q daily give 30 pills and 3 refills     On lipid panel triglycerides high at 218 and HDL low at 29.  Recommend heart healthy diet. Also recommend pt start on Vascepa 1 gram PO BID if insurance will cover.  Give 60 pills and 3 refills if pt agreeable    Kidney and liver function stable     Recheck on next visit thanks

## 2022-11-07 RX ORDER — LORATADINE 10 MG/1
10 TABLET ORAL DAILY
Qty: 30 TABLET | Refills: 3 | Status: SHIPPED | OUTPATIENT
Start: 2022-11-07 | End: 2023-03-13 | Stop reason: SDUPTHER

## 2022-12-01 RX ORDER — CHOLECALCIFEROL (VITAMIN D3) 125 MCG
500 CAPSULE ORAL DAILY
Qty: 30 TABLET | Refills: 3 | Status: SHIPPED | OUTPATIENT
Start: 2022-12-01 | End: 2023-03-13 | Stop reason: SDUPTHER

## 2022-12-01 RX ORDER — FLASH GLUCOSE SENSOR
1 KIT MISCELLANEOUS
Qty: 2 EACH | Refills: 3 | Status: SHIPPED | OUTPATIENT
Start: 2022-12-01 | End: 2023-01-16 | Stop reason: SDUPTHER

## 2022-12-01 RX ORDER — ICOSAPENT ETHYL 1000 MG/1
2 CAPSULE ORAL 2 TIMES DAILY WITH MEALS
Qty: 120 CAPSULE | Refills: 3 | Status: SHIPPED | OUTPATIENT
Start: 2022-12-01 | End: 2023-03-13 | Stop reason: SDUPTHER

## 2022-12-01 RX ORDER — ALBUTEROL SULFATE 90 UG/1
2 AEROSOL, METERED RESPIRATORY (INHALATION) EVERY 4 HOURS PRN
Qty: 18 G | Refills: 3 | Status: SHIPPED | OUTPATIENT
Start: 2022-12-01 | End: 2023-03-13 | Stop reason: SDUPTHER

## 2022-12-19 RX ORDER — INSULIN ASPART 100 [IU]/ML
20 INJECTION, SOLUTION INTRAVENOUS; SUBCUTANEOUS
Qty: 15 ML | Refills: 0 | Status: SHIPPED | OUTPATIENT
Start: 2022-12-19 | End: 2023-03-13 | Stop reason: SDUPTHER

## 2022-12-19 RX ORDER — FLURBIPROFEN SODIUM 0.3 MG/ML
1 SOLUTION/ DROPS OPHTHALMIC 4 TIMES DAILY
Qty: 1000 EACH | Refills: 3 | Status: SHIPPED | OUTPATIENT
Start: 2022-12-19 | End: 2023-03-13 | Stop reason: SDUPTHER

## 2022-12-19 RX ORDER — INSULIN GLARGINE 100 [IU]/ML
110 INJECTION, SOLUTION SUBCUTANEOUS NIGHTLY
Qty: 15 ML | Refills: 3 | Status: SHIPPED | OUTPATIENT
Start: 2022-12-19 | End: 2023-03-13 | Stop reason: SDUPTHER

## 2022-12-19 RX ORDER — APIXABAN 5 MG/1
5 TABLET, FILM COATED ORAL DAILY
Qty: 30 TABLET | Refills: 3 | Status: SHIPPED | OUTPATIENT
Start: 2022-12-19 | End: 2022-12-20 | Stop reason: SDUPTHER

## 2022-12-19 RX ORDER — INSULIN GLARGINE 100 [IU]/ML
110 INJECTION, SOLUTION SUBCUTANEOUS NIGHTLY
Qty: 15 ML | Refills: 3 | Status: CANCELLED | OUTPATIENT
Start: 2022-12-19

## 2022-12-19 NOTE — TELEPHONE ENCOUNTER
Rx Refill Note  Requested Prescriptions     Pending Prescriptions Disp Refills   • Eliquis 5 MG tablet tablet 30 tablet 3     Sig: Take 1 tablet by mouth Daily.   • NovoLOG FlexPen 100 UNIT/ML solution pen-injector sc pen 15 mL 0     Sig: Inject 20 Units under the skin into the appropriate area as directed 3 (Three) Times a Day With Meals. No more than 70 units daily   • B-D UF III MINI PEN NEEDLES 31G X 5 MM misc 1000 each 3     Sig: Inject 1 application under the skin into the appropriate area as directed 4 (Four) Times a Day.      Last office visit with prescribing clinician: 10/19/2022   Last telemedicine visit with prescribing clinician: 12/19/2022   Next office visit with prescribing clinician: 12/19/2022   {TIP  Encounters    {TIP  Please add Last Relevant Lab Date if appropriate             {TIP  Is Refill Pharmacy correct? yes    Would you like a call back once the refill request has been completed: [] Yes [] No    If the office needs to give you a call back, can they leave a voicemail: [] Yes [] No    Jin Wilcox LPN  12/19/22, 11:38 CST

## 2022-12-20 RX ORDER — APIXABAN 5 MG/1
5 TABLET, FILM COATED ORAL 2 TIMES DAILY
Qty: 60 TABLET | Refills: 3 | Status: SHIPPED | OUTPATIENT
Start: 2022-12-20 | End: 2023-03-13 | Stop reason: SDUPTHER

## 2022-12-20 NOTE — TELEPHONE ENCOUNTER
Prinec Tobin MD  You 2 minutes ago (12:20 PM)     It should be twice a day. Let pharmacy know thanks

## 2022-12-20 NOTE — TELEPHONE ENCOUNTER
Called pharmacy and they would like to verify if eliquis is to be taken 1 time a day or twice a day. She stated normal dose for maintenance is twice daily. Called pt to verify but wife stated he is sleeping.

## 2023-01-07 NOTE — PROGRESS NOTES
Subjective:  Shun Coleman is a 47 y.o. male who presents for       Patient Active Problem List   Diagnosis   • Tobacco user   • Essential hypertension   • Mixed hyperlipidemia   • Morbid (severe) obesity due to excess calories (HCC)   • Tobacco abuse counseling   • Chronic anticoagulation   • Longstanding persistent atrial fibrillation (HCC)   • Type 2 diabetes mellitus with diabetic neuropathy, with long-term current use of insulin (HCC)   • Diabetic foot (HCC)   • Bunion   • Encounter for screening for malignant neoplasm of colon   • Chronic atrial fibrillation (HCC)   • Gastroesophageal reflux disease   • Uncontrolled type 2 diabetes mellitus with hypoglycemia without coma (HCC)   • Polycythemia           Current Outpatient Medications:   •  atenolol (TENORMIN) 25 MG tablet, Take 1 tablet by mouth 2 (Two) Times a Day., Disp: 60 tablet, Rfl: 3  •  atorvastatin (LIPITOR) 40 MG tablet, Take 1 tablet by mouth Daily., Disp: 30 tablet, Rfl: 3  •  B-D UF III MINI PEN NEEDLES 31G X 5 MM misc, Inject 1 application under the skin into the appropriate area as directed 4 (Four) Times a Day., Disp: 1000 each, Rfl: 3  •  celecoxib (CeleBREX) 200 MG capsule, Take 200 mg by mouth As Needed., Disp: , Rfl:   •  chlorhexidine (Peridex) 0.12 % solution, Apply 15 mL to the mouth or throat 2 (Two) Times a Day., Disp: 473 mL, Rfl: 1  •  Continuous Blood Gluc  (FreeStyle Laurita 2 [place]) device, 1 application 3 (Three) Times a Day., Disp: 1 each, Rfl: 3  •  Continuous Blood Gluc Sensor (FreeStyle Laurita 2 Sensor) misc, 1 application 3 (Three) Times a Day., Disp: 3 each, Rfl: 3  •  Eliquis 5 MG tablet tablet, Take 1 tablet by mouth 2 (Two) Times a Day., Disp: 60 tablet, Rfl: 3  •  empagliflozin (Jardiance) 25 MG tablet tablet, Take 1 tablet by mouth Daily., Disp: 30 tablet, Rfl: 3  •  FLUTICASONE PROPIONATE, NASAL, NA, 2 sprays into the nostril(s) as directed by provider Daily., Disp: , Rfl:   •  gemfibrozil (LOPID) 600  MG tablet, Take 1 tablet by mouth 2 (Two) Times a Day., Disp: 60 tablet, Rfl: 3  •  icosapent ethyl (VASCEPA) 1 g capsule capsule, Take 2 g by mouth 2 (Two) Times a Day With Meals., Disp: 120 capsule, Rfl: 3  •  Insulin Glargine (Lantus SoloStar) 100 UNIT/ML injection pen, Inject 110 Units under the skin into the appropriate area as directed Every Night., Disp: 15 mL, Rfl: 3  •  lisinopril (PRINIVIL,ZESTRIL) 2.5 MG tablet, Take 1 tablet by mouth Daily., Disp: 30 tablet, Rfl: 3  •  loratadine (CLARITIN) 10 MG tablet, Take 1 tablet by mouth Daily., Disp: 30 tablet, Rfl: 3  •  metFORMIN ER (GLUCOPHAGE-XR) 500 MG 24 hr tablet, Take 1 tablet by mouth 3 (Three) Times a Day., Disp: 90 tablet, Rfl: 3  •  nicotine polacrilex (COMMIT) 4 MG lozenge, Dissolve 1 lozenge in the mouth As Needed for Smoking Cessation. Use every 4 hours PRN. Max is 6 lozenges a day, Disp: 108 lozenge, Rfl: 3  •  NovoLOG FlexPen 100 UNIT/ML solution pen-injector sc pen, Inject 20 Units under the skin into the appropriate area as directed 3 (Three) Times a Day With Meals. No more than 70 units daily, Disp: 15 mL, Rfl: 0  •  pantoprazole (PROTONIX) 20 MG EC tablet, Take 1 tablet by mouth Daily. (Patient taking differently: Take 20 mg by mouth Daily As Needed.), Disp: 30 tablet, Rfl: 3  •  Ventolin  (90 Base) MCG/ACT inhaler, Inhale 2 puffs Every 4 (Four) Hours As Needed for Wheezing., Disp: 18 g, Rfl: 3  •  vitamin B-12 (CYANOCOBALAMIN) 500 MCG tablet, Take 1 tablet by mouth Daily., Disp: 30 tablet, Rfl: 3  •  vitamin D (ERGOCALCIFEROL) 1.25 MG (86928 UT) capsule capsule, Take 1 capsule by mouth 1 (One) Time Per Week., Disp: 5 capsule, Rfl: 3  •  gabapentin (Neurontin) 800 MG tablet, Take 1 tablet by mouth 3 (Three) Times a Day., Disp: 90 tablet, Rfl: 2  •  nicotine (Nicotrol) 10 MG inhaler, Inhale 1 puff As Needed for Smoking Cessation. Use up to 6 times a day, Disp: 168 each, Rfl: 3    HPI       Pt is 46 yo male with management of HTN,  Diabetes HLP, GERD, morbid obesity, tobacco user, diabetic neuropathy, allergic rhinitis, atrial fibrillation sp cardiac ablation, vitamin D deficiency, onychomycosis, internal/external hemorrhoids      8/18/22 in office visit for recheck. Since last viist pt has Canceled appts with Pulmonlogy.  He did see Podiatry on 7/19/22 for diabetic foot care onychomycosis and onchogryphosis. His toenails were debrided. He also saw GI on 7/22/22 for colonoscopy screening. He was offered EGD for potential Barker's but pt declined.  Pt had labwork done on 6/20/22 that showed vitamin D low. Lipid panel showed HDL at 24 hga1c at  9.00 pt was started on rybelsus 3 mg PO q daily.  On CMP GFR at 117 and stable liver enzymes. CBC showed RBC at 6.32 HCT at 53.4 . Pt was 309 lbs last visit and is now at 287 lbs. He has yet to use rybelsus.   Denies any chest pain no dizziness. He has yet to establish with CArdiologist. He was seeing Cardiology in [place] Dr. Doe. He had cardiac ablation in 2019.  He has been in NSR since.  He is also concerned about sore on bottom of right big toe present for past few weeks. It was black but has changed color to brown. No drainage     10/19/22 in office visit for recheck. Pt was treated for Dental Abscess on 9./28/22 with PCN.  Pt had colonoscopy screening on 9/2/22 that showed internal/external hemorrhoids. Pt has yet to get labwork ordered on 8/18/22. Pt canceled his appt with Cardiology on 10/13/22. Pt lost 8 lbs since his last visit. He is doing fair. His sugars have been running in 160s range on average. He has not taken rybelsus yet.  He has yet to use nicotine lozenges.    1/27/23 in office visit for recheck. Pt had labwork done on 10/24/22 that showed stable TSH vitamin B12 vitamin D was low at 13.9. lipid panel showed triglcyerides at 218 HDL at 29. hga1c is at  8.30 from 9.00 CMP showed glucose at 120 creatine at 0.73 with stable GFR and liver function. He is doing good. His sugars have  been stable. He is on 90 units of lantus and 15 units of novolog. He is not taking rybelsus.  He has cut back on tobacco about 1 ppd. gus also has a spot on right foot      Diabetes  He presents for his follow-up diabetic visit. He has type 2 diabetes mellitus. His disease course has been stable. Pertinent negatives for hypoglycemia include no dizziness or headaches. Associated symptoms include fatigue and weakness. Pertinent negatives for diabetes include no blurred vision, no chest pain, no foot paresthesias, no foot ulcerations, no polydipsia, no polyphagia, no visual change and no weight loss. Pertinent negatives for diabetic complications include no CVA, PVD or retinopathy. Current diabetic treatment includes insulin injections and oral agent (monotherapy). He is compliant with treatment most of the time. He participates in exercise daily. There is no change in his home blood glucose trend. An ACE inhibitor/angiotensin II receptor blocker is not being taken. He does not see a podiatrist.Eye exam is not current.   Hypertension  This is a chronic problem. The current episode started more than 1 month ago. The problem is uncontrolled. Pertinent negatives include no blurred vision, chest pain, headaches, palpitations or shortness of breath. Risk factors for coronary artery disease include diabetes mellitus, male gender, obesity and sedentary lifestyle. Current antihypertension treatment includes nothing. The current treatment provides no improvement. There is no history of angina, kidney disease, CVA, heart failure, left ventricular hypertrophy, PVD or retinopathy. There is no history of chronic renal disease, coarctation of the aorta, hyperaldosteronism, hypercortisolism, hyperparathyroidism, a hypertension causing med, pheochromocytoma, renovascular disease, sleep apnea or a thyroid problem.   Obesity  This is a chronic problem. The current episode started more than 1 year ago. The problem occurs constantly. The  problem has been unchanged. Associated symptoms include arthralgias, fatigue, numbness and weakness. Pertinent negatives include no chest pain, chills, congestion, coughing, diaphoresis, fever, headaches, nausea, sore throat, visual change or vomiting. Nothing aggravates the symptoms. He has tried nothing for the symptoms. The treatment provided no relief.   Atrial Fibrillation  Presents for initial visit. Symptoms include hypertension and weakness. Symptoms are negative for an AICD problem, bradycardia, chest pain, dizziness, hemodynamic instability, hypotension, pacemaker problem, palpitations, shortness of breath, syncope and tachycardia. The symptoms have been stable. Past treatments include anticoagulant. Past medical history includes atrial fibrillation and HTN. There is no history of atrial flutter, AICD, CABG/stent, CAD, CHF, DVT, hyperlipidemia and valvular heart disease.     Review of Systems  Review of Systems   Constitutional: Positive for activity change and fatigue. Negative for appetite change, chills, diaphoresis and fever.   HENT: Negative for congestion, postnasal drip, rhinorrhea, sinus pressure, sinus pain, sneezing, sore throat, trouble swallowing and voice change.    Respiratory: Positive for shortness of breath. Negative for cough, choking, chest tightness, wheezing and stridor.    Cardiovascular: Negative for chest pain.   Gastrointestinal: Negative for diarrhea, nausea and vomiting.   Musculoskeletal: Positive for arthralgias.   Neurological: Positive for weakness and numbness. Negative for headaches.   Psychiatric/Behavioral: The patient is nervous/anxious.         Depressed mood        Patient Active Problem List   Diagnosis   • Tobacco user   • Essential hypertension   • Mixed hyperlipidemia   • Morbid (severe) obesity due to excess calories (HCC)   • Tobacco abuse counseling   • Chronic anticoagulation   • Longstanding persistent atrial fibrillation (HCC)   • Type 2 diabetes mellitus  "with diabetic neuropathy, with long-term current use of insulin (HCC)   • Diabetic foot (HCC)   • Bunion   • Encounter for screening for malignant neoplasm of colon   • Chronic atrial fibrillation (HCC)   • Gastroesophageal reflux disease   • Uncontrolled type 2 diabetes mellitus with hypoglycemia without coma (HCC)   • Polycythemia     Past Surgical History:   Procedure Laterality Date   • CARDIAC ABLATION     • COLONOSCOPY N/A 9/2/2022    Procedure: COLONOSCOPY;  Surgeon: Jose Jaffe MD;  Location: [facility] ENDOSCOPY;  Service: Gastroenterology;  Laterality: N/A;     Social History     Socioeconomic History   • Marital status:    Tobacco Use   • Smoking status: Every Day     Packs/day: 1.00     Years: 35.00     Pack years: 35.00     Types: Cigarettes   • Smokeless tobacco: Former     Types: Chew   Vaping Use   • Vaping Use: Former   • Substances: Nicotine   • Devices: Refillable tank   Substance and Sexual Activity   • Alcohol use: Not Currently   • Drug use: Not Currently     Types: \"Crack\" cocaine, Marijuana   • Sexual activity: Defer     Comment: .     Family History   Problem Relation Age of Onset   • Heart disease Mother    • Kidney disease Father    • Heart disease Father    • Kidney disease Sister    • Diabetes Other    • Cancer Other    • Stroke Other    • Hypertension Other    • Allergy (severe) Other    • Cholelithiasis Other      Lab on 10/24/2022   Component Date Value Ref Range Status   • WBC 10/24/2022 10.21  3.40 - 10.80 10*3/mm3 Final   • RBC 10/24/2022 6.38 (H)  4.14 - 5.80 10*6/mm3 Final   • Hemoglobin 10/24/2022 18.7 (H)  13.0 - 17.7 g/dL Final   • Hematocrit 10/24/2022 52.9 (H)  37.5 - 51.0 % Final   • MCV 10/24/2022 82.9  79.0 - 97.0 fL Final   • MCH 10/24/2022 29.3  26.6 - 33.0 pg Final   • MCHC 10/24/2022 35.3  31.5 - 35.7 g/dL Final   • RDW 10/24/2022 14.3  12.3 - 15.4 % Final   • RDW-SD 10/24/2022 41.6  37.0 - 54.0 fl Final   • MPV 10/24/2022 10.5  6.0 - 12.0 fL Final   • " Platelets 10/24/2022 304  140 - 450 10*3/mm3 Final   • Neutrophil % 10/24/2022 52.5  42.7 - 76.0 % Final   • Lymphocyte % 10/24/2022 37.0  19.6 - 45.3 % Final   • Monocyte % 10/24/2022 7.5  5.0 - 12.0 % Final   • Eosinophil % 10/24/2022 1.6  0.3 - 6.2 % Final   • Basophil % 10/24/2022 0.9  0.0 - 1.5 % Final   • Immature Grans % 10/24/2022 0.5  0.0 - 0.5 % Final   • Neutrophils, Absolute 10/24/2022 5.36  1.70 - 7.00 10*3/mm3 Final   • Lymphocytes, Absolute 10/24/2022 3.78 (H)  0.70 - 3.10 10*3/mm3 Final   • Monocytes, Absolute 10/24/2022 0.77  0.10 - 0.90 10*3/mm3 Final   • Eosinophils, Absolute 10/24/2022 0.16  0.00 - 0.40 10*3/mm3 Final   • Basophils, Absolute 10/24/2022 0.09  0.00 - 0.20 10*3/mm3 Final   • Immature Grans, Absolute 10/24/2022 0.05  0.00 - 0.05 10*3/mm3 Final   • nRBC 10/24/2022 0.1  0.0 - 0.2 /100 WBC Final   • Glucose 10/24/2022 120 (H)  65 - 99 mg/dL Final   • BUN 10/24/2022 17  6 - 20 mg/dL Final   • Creatinine 10/24/2022 0.73 (L)  0.76 - 1.27 mg/dL Final   • Sodium 10/24/2022 139  136 - 145 mmol/L Final   • Potassium 10/24/2022 5.0  3.5 - 5.2 mmol/L Final   • Chloride 10/24/2022 100  98 - 107 mmol/L Final   • CO2 10/24/2022 27.9  22.0 - 29.0 mmol/L Final   • Calcium 10/24/2022 9.2  8.6 - 10.5 mg/dL Final   • Total Protein 10/24/2022 7.3  6.0 - 8.5 g/dL Final   • Albumin 10/24/2022 4.30  3.50 - 5.20 g/dL Final   • ALT (SGPT) 10/24/2022 18  1 - 41 U/L Final   • AST (SGOT) 10/24/2022 18  1 - 40 U/L Final   • Alkaline Phosphatase 10/24/2022 108  39 - 117 U/L Final   • Total Bilirubin 10/24/2022 0.4  0.0 - 1.2 mg/dL Final   • Globulin 10/24/2022 3.0  gm/dL Final   • A/G Ratio 10/24/2022 1.4  g/dL Final   • BUN/Creatinine Ratio 10/24/2022 23.3  7.0 - 25.0 Final   • Anion Gap 10/24/2022 11.1  5.0 - 15.0 mmol/L Final   • eGFR 10/24/2022 112.2  >60.0 mL/min/1.73 Final    National Kidney Foundation and American Society of Nephrology (ASN) Task Force recommended calculation based on the Chronic Kidney  "Disease Epidemiology Collaboration (CKD-EPI) equation refit without adjustment for race.   • Hemoglobin A1C 10/24/2022 8.30 (H)  4.80 - 5.60 % Final   • Total Cholesterol 10/24/2022 119  0 - 200 mg/dL Final   • Triglycerides 10/24/2022 218 (H)  0 - 150 mg/dL Final   • HDL Cholesterol 10/24/2022 29 (L)  40 - 60 mg/dL Final   • LDL Cholesterol  10/24/2022 55  0 - 100 mg/dL Final   • VLDL Cholesterol 10/24/2022 35  5 - 40 mg/dL Final   • LDL/HDL Ratio 10/24/2022 1.60   Final   • TSH 10/24/2022 3.270  0.270 - 4.200 uIU/mL Final   • 25 Hydroxy, Vitamin D 10/24/2022 13.9 (L)  30.0 - 100.0 ng/ml Final   • Vitamin B-12 10/24/2022 283  211 - 946 pg/mL Final      No image results found.    @[email or web address]  Immunization History   Administered Date(s) Administered   • COVID-19 (MODERNA) 1st, 2nd, 3rd Dose Only 06/07/2021, 07/12/2021   • COVID-19 (MODERNA) BOOSTER 02/07/2022   • FluLaval/Fluzone >6mos 10/19/2022   • Influenza, Unspecified 10/19/2022   • Tdap 05/13/2020       The following portions of the patient's history were reviewed and updated as appropriate: allergies, current medications, past family history, past medical history, past social history, past surgical history and problem list.        Physical Exam  /60 (BP Location: Right arm, Patient Position: Sitting, Cuff Size: Large Adult)   Pulse 72   Temp 98 °F (36.7 °C)   Ht 177.8 cm (70\")   Wt 123 kg (272 lb)   SpO2 95%   BMI 39.03 kg/m²         Physical Exam  Vitals and nursing note reviewed.   Constitutional:       Appearance: He is well-developed. He is not diaphoretic.   HENT:      Head: Normocephalic and atraumatic.      Right Ear: External ear normal.   Eyes:      Conjunctiva/sclera: Conjunctivae normal.      Pupils: Pupils are equal, round, and reactive to light.   Cardiovascular:      Rate and Rhythm: Normal rate and regular rhythm.      Heart sounds: Normal heart sounds. No murmur heard.  Pulmonary:      Effort: Pulmonary effort is normal. No " respiratory distress.      Comments: Decreased breath sounds   Abdominal:      General: Bowel sounds are normal. There is no distension.      Palpations: Abdomen is soft.      Tenderness: There is no abdominal tenderness.      Comments: Obese abdomen    Musculoskeletal:         General: Tenderness present. No deformity. Normal range of motion.      Cervical back: Normal range of motion and neck supple.   Feet:      Comments: Callus or neoplasm on right foot/toe   Skin:     General: Skin is warm.      Coloration: Skin is not pale.      Findings: No erythema or rash.   Neurological:      Mental Status: He is alert and oriented to person, place, and time.      Cranial Nerves: No cranial nerve deficit.   Psychiatric:         Behavior: Behavior normal.         [unfilled]   Diagnosis Plan   1. Type 2 diabetes mellitus with diabetic neuropathy, with long-term current use of insulin ([facility])  gabapentin (Neurontin) 800 MG tablet      2. Chronic anticoagulation  CBC Auto Differential    Comprehensive Metabolic Panel    Hemoglobin A1c    Lipid Panel    Vitamin D,25-Hydroxy    Vitamin B12    Microalbumin / Creatinine Urine Ratio - Urine, Clean Catch      3. Chronic atrial fibrillation (HCC)  CBC Auto Differential    Comprehensive Metabolic Panel    Hemoglobin A1c    Lipid Panel    Vitamin D,25-Hydroxy    Vitamin B12    Microalbumin / Creatinine Urine Ratio - Urine, Clean Catch      4. Mixed hyperlipidemia  CBC Auto Differential    Comprehensive Metabolic Panel    Hemoglobin A1c    Lipid Panel    Vitamin D,25-Hydroxy    Vitamin B12    Microalbumin / Creatinine Urine Ratio - Urine, Clean Catch      5. Essential hypertension  CBC Auto Differential    Comprehensive Metabolic Panel    Hemoglobin A1c    Lipid Panel    Vitamin D,25-Hydroxy    Vitamin B12    Microalbumin / Creatinine Urine Ratio - Urine, Clean Catch      6. Tobacco user  CBC Auto Differential    Comprehensive Metabolic Panel    Hemoglobin A1c    Lipid Panel     Vitamin D,25-Hydroxy    Vitamin B12    Microalbumin / Creatinine Urine Ratio - Urine, Clean Catch      7. Polycythemia  CBC Auto Differential    Comprehensive Metabolic Panel    Hemoglobin A1c    Lipid Panel    Vitamin D,25-Hydroxy    Vitamin B12    Microalbumin / Creatinine Urine Ratio - Urine, Clean Catch      8. Uncontrolled type 2 diabetes mellitus with hypoglycemia without coma (HCC)  CBC Auto Differential    Comprehensive Metabolic Panel    Hemoglobin A1c    Lipid Panel    Vitamin D,25-Hydroxy    Vitamin B12    Microalbumin / Creatinine Urine Ratio - Urine, Clean Catch      9. Class 2 severe obesity with serious comorbidity and body mass index (BMI) of 39.0 to 39.9 in adult, unspecified obesity type (HCC)                  -recommend labwork   -recommend diabetic eye exam  -diabetic foot care/onychomycosis - Podiatry following. He will need to make an appt   -multiple joint pain -  On celebrex.    -atrial fibrillation- on atenolol 25 mg pO BId on elqiuis 5 mg PO q daily.   Referred to Cardiology   -allergic rhinitis - on claritin 10 mg PO q daily.   -HTN-  On atenolol 25 mg PO BID. On lisinopril 2.5 mg daily.   -DM type 2 - on tresiba 110 units , novolog injections on metformin  mg PO q daily on jardiance 25 mg daily.   he has yet to take rybelsus   - obesity - counseled weight loss >5 minutes BMI at 39.03  -HLP- on lopid 600 mg daily. On lipitor 40 mg PO qhs Check lipid panel recommend heart healthy diet  -COPD- refer to Pulmonology. Advised to quit smoking. Referred  to Dr. Bhatti   -DANYA on Cpap - referred to sleep medicine. Will refer to [place]   -diabetic neuropathy - on neurontin 600 mg PO TID. Will go up to 800 mg PO TID   -tobacco smoker - counseled quit smoking >5 minutes recommend 1 800 QUIT NOW recommend nicotine replacement therapy   -GERD - on protonix 20 mg daily.   -advised pt to be safe and call with questions and concerns  -advised pt to go to ER or call 911 if symptoms worrisome  or severe  -advised pt to followup with specialist and referrals  -advised pt to be safe during COVID-19 pandemic  I spent 45  minutes caring for Shun on this date of service. This time includes time spent by me in the following activities: preparing for the visit, reviewing tests, obtaining and/or reviewing a separately obtained history, performing a medically appropriate examination and/or evaluation, counseling and educating the patient/family/caregiver, ordering medications, tests, or procedures, referring and communicating with other health care professionals, documenting information in the medical record, independently interpreting results and communicating that information with the patient/family/caregiver and care coordination.   -recheck in 3 months         This document has been electronically signed by Prince Tobin MD on January 27, 2023 14:34 CST

## 2023-01-16 RX ORDER — FLASH GLUCOSE SENSOR
1 KIT MISCELLANEOUS
Qty: 2 EACH | Refills: 3 | Status: SHIPPED | OUTPATIENT
Start: 2023-01-16 | End: 2023-01-23 | Stop reason: SDUPTHER

## 2023-01-16 RX ORDER — FLASH GLUCOSE SENSOR
1 KIT MISCELLANEOUS
Qty: 2 EACH | Refills: 3 | Status: SHIPPED | OUTPATIENT
Start: 2023-01-16 | End: 2023-01-16 | Stop reason: SDUPTHER

## 2023-01-16 RX ORDER — FLASH GLUCOSE SCANNING READER
1 EACH MISCELLANEOUS 3 TIMES DAILY
Qty: 1 EACH | Refills: 3 | Status: SHIPPED | OUTPATIENT
Start: 2023-01-16 | End: 2023-01-23 | Stop reason: SDUPTHER

## 2023-01-16 NOTE — TELEPHONE ENCOUNTER
PATIENT CALLED AGAIN REGARDING THE DONAVAN. HE IS NEEDING A WHOLE NEW STARTER KIT. THE METER THAT READS THE SENOR IS BROKEN. HE IS WANTING TO UPGRADE TO THE NEW ONE IF POSSIBLE. PHARMACY IS [facility]

## 2023-01-16 NOTE — TELEPHONE ENCOUNTER
PATIENT WOULD LIKE THIS SENT [employer]. HE IS NEEDING A WHOLE NEW METER AND THE SENSORS. PATIENT'S NUMBER -[phone]

## 2023-01-20 ENCOUNTER — TELEPHONE (OUTPATIENT)
Dept: FAMILY MEDICINE CLINIC | Facility: CLINIC | Age: 48
End: 2023-01-20
Payer: MEDICAID

## 2023-01-23 ENCOUNTER — TELEPHONE (OUTPATIENT)
Dept: FAMILY MEDICINE CLINIC | Facility: CLINIC | Age: 48
End: 2023-01-23
Payer: MEDICAID

## 2023-01-23 RX ORDER — FLASH GLUCOSE SENSOR
1 KIT MISCELLANEOUS
Qty: 2 EACH | Refills: 3 | Status: SHIPPED | OUTPATIENT
Start: 2023-01-23 | End: 2023-01-23 | Stop reason: CLARIF

## 2023-01-23 RX ORDER — FLASH GLUCOSE SCANNING READER
1 EACH MISCELLANEOUS 3 TIMES DAILY
Qty: 1 EACH | Refills: 3 | Status: SHIPPED | OUTPATIENT
Start: 2023-01-23 | End: 2023-01-23

## 2023-01-23 NOTE — TELEPHONE ENCOUNTER
Patient called asking for everything for the Laurita 2 (start kit, sensor, machine, etc.) sent to [facility] pharmacy as [facility] no longer caries it.  Call back number: [phone]

## 2023-01-23 NOTE — TELEPHONE ENCOUNTER
Pharmacy called and needs a new prescription for the FreeStyle Laurita meter and sensor from a one to a two as his pharmacy will pay for this

## 2023-01-25 PROBLEM — D75.1 POLYCYTHEMIA: Status: ACTIVE | Noted: 2023-01-25

## 2023-01-25 PROBLEM — E11.649 UNCONTROLLED TYPE 2 DIABETES MELLITUS WITH HYPOGLYCEMIA WITHOUT COMA: Status: ACTIVE | Noted: 2023-01-25

## 2023-01-27 ENCOUNTER — OFFICE VISIT (OUTPATIENT)
Dept: FAMILY MEDICINE CLINIC | Facility: CLINIC | Age: 48
End: 2023-01-27
Payer: MEDICAID

## 2023-01-27 VITALS
DIASTOLIC BLOOD PRESSURE: 60 MMHG | BODY MASS INDEX: 38.94 KG/M2 | TEMPERATURE: 98 F | HEART RATE: 72 BPM | HEIGHT: 70 IN | WEIGHT: 272 LBS | SYSTOLIC BLOOD PRESSURE: 112 MMHG | OXYGEN SATURATION: 95 %

## 2023-01-27 DIAGNOSIS — E66.01 CLASS 2 SEVERE OBESITY WITH SERIOUS COMORBIDITY AND BODY MASS INDEX (BMI) OF 39.0 TO 39.9 IN ADULT, UNSPECIFIED OBESITY TYPE: ICD-10-CM

## 2023-01-27 DIAGNOSIS — I10 ESSENTIAL HYPERTENSION: ICD-10-CM

## 2023-01-27 DIAGNOSIS — D75.1 POLYCYTHEMIA: ICD-10-CM

## 2023-01-27 DIAGNOSIS — E11.649 UNCONTROLLED TYPE 2 DIABETES MELLITUS WITH HYPOGLYCEMIA WITHOUT COMA: ICD-10-CM

## 2023-01-27 DIAGNOSIS — E11.40 TYPE 2 DIABETES MELLITUS WITH DIABETIC NEUROPATHY, WITH LONG-TERM CURRENT USE OF INSULIN: Primary | ICD-10-CM

## 2023-01-27 DIAGNOSIS — Z79.4 TYPE 2 DIABETES MELLITUS WITH DIABETIC NEUROPATHY, WITH LONG-TERM CURRENT USE OF INSULIN: Primary | ICD-10-CM

## 2023-01-27 DIAGNOSIS — Z72.0 TOBACCO USER: ICD-10-CM

## 2023-01-27 DIAGNOSIS — I48.20 CHRONIC ATRIAL FIBRILLATION: ICD-10-CM

## 2023-01-27 DIAGNOSIS — E78.2 MIXED HYPERLIPIDEMIA: ICD-10-CM

## 2023-01-27 DIAGNOSIS — Z79.01 CHRONIC ANTICOAGULATION: ICD-10-CM

## 2023-01-27 PROCEDURE — 99214 OFFICE O/P EST MOD 30 MIN: CPT | Performed by: FAMILY MEDICINE

## 2023-01-27 RX ORDER — GABAPENTIN 800 MG/1
800 TABLET ORAL 3 TIMES DAILY
Qty: 90 TABLET | Refills: 2 | Status: SHIPPED | OUTPATIENT
Start: 2023-01-27

## 2023-01-27 RX ORDER — NICOTINE 10 MG
1 CARTRIDGE (EA) INHALATION AS NEEDED
Qty: 168 EACH | Refills: 3 | Status: SHIPPED | OUTPATIENT
Start: 2023-01-27

## 2023-03-13 RX ORDER — GEMFIBROZIL 600 MG/1
600 TABLET, FILM COATED ORAL 2 TIMES DAILY
Qty: 60 TABLET | Refills: 3 | Status: SHIPPED | OUTPATIENT
Start: 2023-03-13

## 2023-03-13 RX ORDER — INSULIN ASPART 100 [IU]/ML
20 INJECTION, SOLUTION INTRAVENOUS; SUBCUTANEOUS
Qty: 15 ML | Refills: 0 | Status: SHIPPED | OUTPATIENT
Start: 2023-03-13

## 2023-03-13 RX ORDER — APIXABAN 5 MG/1
5 TABLET, FILM COATED ORAL 2 TIMES DAILY
Qty: 60 TABLET | Refills: 3 | Status: SHIPPED | OUTPATIENT
Start: 2023-03-13

## 2023-03-13 RX ORDER — LISINOPRIL 2.5 MG/1
2.5 TABLET ORAL DAILY
Qty: 30 TABLET | Refills: 3 | Status: SHIPPED | OUTPATIENT
Start: 2023-03-13

## 2023-03-13 RX ORDER — INSULIN GLARGINE 100 [IU]/ML
110 INJECTION, SOLUTION SUBCUTANEOUS NIGHTLY
Qty: 15 ML | Refills: 3 | Status: SHIPPED | OUTPATIENT
Start: 2023-03-13

## 2023-03-13 RX ORDER — ERGOCALCIFEROL 1.25 MG/1
50000 CAPSULE ORAL WEEKLY
Qty: 5 CAPSULE | Refills: 3 | Status: SHIPPED | OUTPATIENT
Start: 2023-03-13

## 2023-03-13 RX ORDER — ATENOLOL 25 MG/1
25 TABLET ORAL 2 TIMES DAILY
Qty: 60 TABLET | Refills: 3 | Status: SHIPPED | OUTPATIENT
Start: 2023-03-13

## 2023-03-13 RX ORDER — ICOSAPENT ETHYL 1000 MG/1
2 CAPSULE ORAL 2 TIMES DAILY WITH MEALS
Qty: 120 CAPSULE | Refills: 3 | Status: SHIPPED | OUTPATIENT
Start: 2023-03-13

## 2023-03-13 RX ORDER — LORATADINE 10 MG/1
10 TABLET ORAL DAILY
Qty: 30 TABLET | Refills: 3 | Status: SHIPPED | OUTPATIENT
Start: 2023-03-13

## 2023-03-13 RX ORDER — FLURBIPROFEN SODIUM 0.3 MG/ML
1 SOLUTION/ DROPS OPHTHALMIC 4 TIMES DAILY
Qty: 1000 EACH | Refills: 3 | Status: SHIPPED | OUTPATIENT
Start: 2023-03-13

## 2023-03-13 RX ORDER — CHOLECALCIFEROL (VITAMIN D3) 125 MCG
500 CAPSULE ORAL DAILY
Qty: 30 TABLET | Refills: 3 | Status: SHIPPED | OUTPATIENT
Start: 2023-03-13

## 2023-03-13 RX ORDER — PANTOPRAZOLE SODIUM 20 MG/1
20 TABLET, DELAYED RELEASE ORAL DAILY
Qty: 30 TABLET | Refills: 3 | Status: SHIPPED | OUTPATIENT
Start: 2023-03-13

## 2023-03-13 RX ORDER — ATORVASTATIN CALCIUM 40 MG/1
40 TABLET, FILM COATED ORAL DAILY
Qty: 30 TABLET | Refills: 3 | Status: SHIPPED | OUTPATIENT
Start: 2023-03-13

## 2023-03-13 RX ORDER — ALBUTEROL SULFATE 90 UG/1
2 AEROSOL, METERED RESPIRATORY (INHALATION) EVERY 4 HOURS PRN
Qty: 18 G | Refills: 3 | Status: SHIPPED | OUTPATIENT
Start: 2023-03-13

## 2023-03-13 RX ORDER — METFORMIN HYDROCHLORIDE 500 MG/1
500 TABLET, EXTENDED RELEASE ORAL 3 TIMES DAILY
Qty: 90 TABLET | Refills: 3 | Status: SHIPPED | OUTPATIENT
Start: 2023-03-13

## 2023-03-13 NOTE — TELEPHONE ENCOUNTER
Initial Anesthesia Post-op Note    Patient: Cindy Nagy  Procedure(s) Performed: RIGHT ANKLE ORIF, SYNDESMOSIS REPAIR - RIGHT  Anesthesia type: General    Vitals Value Taken Time   Temp 97.3 07/12/22 1530   Pulse 87 07/12/22 1529   Resp 12 07/12/22 1529   SpO2 94 % 07/12/22 1529   /82 07/12/22 1527   Vitals shown include unvalidated device data.      Patient Location: PACU Phase 1  Post-op Vital Signs:stable  Level of Consciousness: participates in exam, awake, follows commands and responds to stimulation  Respiratory Status: spontaneous ventilation, nasal cannula and unassisted  Cardiovascular blood pressure returned to baseline  Hydration: euvolemic  Pain Management: well controlled  Handoff: Handoff to receiving nurse was performed and questions were answered  Vomiting: none  Nausea: None  Airway Patency:patent  Post-op Assessment: awake, alert, appropriately conversant, or baseline, no complications, patient tolerated procedure well with no complications and dentition within defined limits  Comments: Handoff given to PACU RN. ELOY.      No complications documented.   Pt. Called asking if all of his prescriptions can be sent to [facility] in [place]. Pt. States he is changing to this pharmacy. Pt also asked if refills can also be put in for the medications that need it.     Pt. Also also asked if his Laurita sensor can be a 3 month supply at a time instead of 1 month at a time.       You can call back at [phone]

## 2023-03-15 RX ORDER — INSULIN ASPART 100 [IU]/ML
INJECTION, SOLUTION INTRAVENOUS; SUBCUTANEOUS
Qty: 15 ML | Refills: 0 | OUTPATIENT
Start: 2023-03-15

## 2023-03-15 NOTE — TELEPHONE ENCOUNTER
Rx Refill Note  Requested Prescriptions     Refused Prescriptions Disp Refills   • NovoLOG FlexPen 100 UNIT/ML solution pen-injector sc pen [Pharmacy Med Name: NOVOLOG FLEXPEN INJ 3ML (ORANGE)] 15 mL 0     Sig: ADMINISTER 20 UNITS UNDER THE SKIN THREE TIMES DAILY WITH MEALS AS DIRECTED. NO MORE THAN 70 UNITS DAILY      Last office visit with prescribing clinician: 1/27/2023   Last telemedicine visit with prescribing clinician: 4/27/2023   Next office visit with prescribing clinician: 4/27/2023   {TIP  Encounters:    RX SENT ON 3/13/23                      Would you like a call back once the refill request has been completed: [] Yes [] No    If the office needs to give you a call back, can they leave a voicemail: [] Yes [] No    Jin Wilcox LPN  03/15/23, 08:15 CDT

## 2023-03-28 NOTE — PROGRESS NOTES
Subjective:  Shun Coleman is a 47 y.o. male who presents for       Patient Active Problem List   Diagnosis   • Tobacco user   • Essential hypertension   • Mixed hyperlipidemia   • Morbid (severe) obesity due to excess calories   • Tobacco abuse counseling   • Chronic anticoagulation   • Longstanding persistent atrial fibrillation   • Type 2 diabetes mellitus with diabetic neuropathy, with long-term current use of insulin   • Diabetic foot   • Bunion   • Encounter for screening for malignant neoplasm of colon   • Chronic atrial fibrillation   • Gastroesophageal reflux disease   • Uncontrolled type 2 diabetes mellitus with hypoglycemia without coma   • Polycythemia   • Nicotine dependence with nicotine-induced disorder   • Class 2 severe obesity with serious comorbidity and body mass index (BMI) of 37.0 to 37.9 in adult           Current Outpatient Medications:   •  atenolol (TENORMIN) 25 MG tablet, Take 1 tablet by mouth 2 (Two) Times a Day., Disp: 60 tablet, Rfl: 3  •  atorvastatin (LIPITOR) 40 MG tablet, Take 1 tablet by mouth Daily., Disp: 30 tablet, Rfl: 3  •  B-D UF III MINI PEN NEEDLES 31G X 5 MM misc, Inject 1 application under the skin into the appropriate area as directed 4 (Four) Times a Day., Disp: 1000 each, Rfl: 3  •  celecoxib (CeleBREX) 200 MG capsule, Take 1 capsule by mouth As Needed., Disp: , Rfl:   •  chlorhexidine (Peridex) 0.12 % solution, Apply 15 mL to the mouth or throat 2 (Two) Times a Day., Disp: 473 mL, Rfl: 1  •  Continuous Blood Gluc  (FreeStyle Laurita 2 Alsip) device, 1 application 3 (Three) Times a Day., Disp: 1 each, Rfl: 3  •  Continuous Blood Gluc Sensor (FreeStyle Laurita 2 Sensor) misc, 1 application 3 (Three) Times a Day., Disp: 3 each, Rfl: 3  •  Eliquis 5 MG tablet tablet, Take 1 tablet by mouth 2 (Two) Times a Day., Disp: 60 tablet, Rfl: 3  •  empagliflozin (Jardiance) 25 MG tablet tablet, Take 1 tablet by mouth Daily., Disp: 30 tablet, Rfl: 3  •  FLUTICASONE  PROPIONATE, NASAL, NA, 2 sprays into the nostril(s) as directed by provider Daily., Disp: , Rfl:   •  gabapentin (Neurontin) 800 MG tablet, Take 1 tablet by mouth 3 (Three) Times a Day., Disp: 90 tablet, Rfl: 2  •  gemfibrozil (LOPID) 600 MG tablet, Take 1 tablet by mouth 2 (Two) Times a Day., Disp: 60 tablet, Rfl: 3  •  icosapent ethyl (VASCEPA) 1 g capsule capsule, Take 2 g by mouth 2 (Two) Times a Day With Meals., Disp: 120 capsule, Rfl: 3  •  Insulin Glargine (Lantus SoloStar) 100 UNIT/ML injection pen, Inject 110 Units under the skin into the appropriate area as directed Every Night., Disp: 15 mL, Rfl: 3  •  lisinopril (PRINIVIL,ZESTRIL) 2.5 MG tablet, Take 1 tablet by mouth Daily., Disp: 30 tablet, Rfl: 3  •  loratadine (CLARITIN) 10 MG tablet, Take 1 tablet by mouth Daily., Disp: 30 tablet, Rfl: 3  •  metFORMIN ER (GLUCOPHAGE-XR) 500 MG 24 hr tablet, Take 1 tablet by mouth 3 (Three) Times a Day., Disp: 90 tablet, Rfl: 3  •  NovoLOG FlexPen 100 UNIT/ML solution pen-injector sc pen, Inject 20 Units under the skin into the appropriate area as directed 3 (Three) Times a Day With Meals. No more than 70 units daily, Disp: 15 mL, Rfl: 0  •  pantoprazole (PROTONIX) 20 MG EC tablet, Take 1 tablet by mouth Daily., Disp: 30 tablet, Rfl: 3  •  Ventolin  (90 Base) MCG/ACT inhaler, Inhale 2 puffs Every 4 (Four) Hours As Needed for Wheezing., Disp: 18 g, Rfl: 3  •  vitamin B-12 (CYANOCOBALAMIN) 500 MCG tablet, Take 1 tablet by mouth Daily., Disp: 30 tablet, Rfl: 3  •  vitamin D (ERGOCALCIFEROL) 1.25 MG (39186 UT) capsule capsule, Take 1 capsule by mouth 1 (One) Time Per Week., Disp: 5 capsule, Rfl: 3  •  Semaglutide (Rybelsus) 3 MG tablet, Take 1 tablet by mouth Daily., Disp: 30 tablet, Rfl: 3       Pt is 48 yo male with management of HTN, Diabetes HLP, GERD, morbid obesity, tobacco user, diabetic neuropathy, allergic rhinitis, atrial fibrillation sp cardiac ablation, vitamin D deficiency, onychomycosis,  internal/external hemorrhoids     10/19/22 in office visit for recheck. Pt was treated for Dental Abscess on 9./28/22 with PCN.  Pt had colonoscopy screening on 9/2/22 that showed internal/external hemorrhoids. Pt has yet to get labwork ordered on 8/18/22. Pt canceled his appt with Cardiology on 10/13/22. Pt lost 8 lbs since his last visit. He is doing fair. His sugars have been running in 160s range on average. He has not taken rybelsus yet.  He has yet to use nicotine lozenges.    1/27/23 in office visit for recheck. Pt had labwork done on 10/24/22 that showed stable TSH vitamin B12 vitamin D was low at 13.9. lipid panel showed triglcyerides at 218 HDL at 29. hga1c is at  8.30 from 9.00 CMP showed glucose at 120 creatine at 0.73 with stable GFR and liver function. He is doing good. His sugars have been stable. He is on 90 units of lantus and 15 units of novolog. He is not taking rybelsus.  He has cut back on tobacco about 1 ppd. hje also has a spot on right foot     4/27/23 in office visit for recheck. Pt did no show up appt with Cardiology on 4/20/23. Pt has yet to get labwork ordered on 1/17/23. He tcontinues to take his medications for DM type 2, GERD, atrial fibrillation and HLP. He continues to smoke tobacco. Pt lost 15 lbs since his last visit. Pt has changed his diet.  Sugars have been stable.  He is taking Lantus 80 units at bedtime along with jardiance and novolog before meals. He did not start taking rybelsus his sugars have been runningi in the 160s range and he uses a freestyle skylar He continues to smoke 1 ppd.  His right elbow has been hurting lately. He did get an injection years ago for right golfer's elbow which does help. No chest pain no dizziness.      Elbow Injury  This is a chronic problem. The current episode started more than 1 month ago. The problem occurs constantly. The problem has been gradually worsening. Associated symptoms include arthralgias, fatigue, numbness and weakness.  Pertinent negatives include no chest pain, chills, congestion, coughing, diaphoresis, fever, headaches, nausea, sore throat or vomiting. He has tried acetaminophen and NSAIDs (steroid injection) for the symptoms. The treatment provided significant relief.   Diabetes  He presents for his follow-up diabetic visit. He has type 2 diabetes mellitus. His disease course has been stable. Pertinent negatives for hypoglycemia include no dizziness or headaches. Associated symptoms include fatigue and weakness. Pertinent negatives for diabetes include no blurred vision, no chest pain, no foot paresthesias, no foot ulcerations, no polydipsia, no polyphagia, no visual change and no weight loss. Pertinent negatives for diabetic complications include no CVA, PVD or retinopathy. Current diabetic treatment includes insulin injections and oral agent (monotherapy). He is compliant with treatment most of the time. He participates in exercise daily. There is no change in his home blood glucose trend. An ACE inhibitor/angiotensin II receptor blocker is not being taken. He does not see a podiatrist.Eye exam is not current.   Hypertension  This is a chronic problem. The current episode started more than 1 month ago. The problem is uncontrolled. Pertinent negatives include no blurred vision, chest pain, headaches, palpitations or shortness of breath. Risk factors for coronary artery disease include diabetes mellitus, male gender, obesity and sedentary lifestyle. Current antihypertension treatment includes nothing. The current treatment provides no improvement. There is no history of angina, kidney disease, CVA, heart failure, left ventricular hypertrophy, PVD or retinopathy. There is no history of chronic renal disease, coarctation of the aorta, hyperaldosteronism, hypercortisolism, hyperparathyroidism, a hypertension causing med, pheochromocytoma, renovascular disease, sleep apnea or a thyroid problem.   Obesity  This is a chronic problem.  The current episode started more than 1 year ago. The problem occurs constantly. The problem has been improving . Associated symptoms include arthralgias, fatigue, numbness and weakness. Pertinent negatives include no chest pain, chills, congestion, coughing, diaphoresis, fever, headaches, nausea, sore throat, visual change or vomiting. Nothing aggravates the symptoms. He has tried nothing for the symptoms. The treatment provided no relief.   Atrial Fibrillation  Presents for followup visit. Symptoms include hypertension and weakness. Symptoms are negative for an AICD problem, bradycardia, chest pain, dizziness, hemodynamic instability, hypotension, pacemaker problem, palpitations, shortness of breath, syncope and tachycardia. The symptoms have been stable. Past treatments include anticoagulant. Past medical history includes atrial fibrillation and HTN. There is no history of atrial flutter, AICD, CABG/stent, CAD, CHF, DVT, hyperlipidemia and valvular heart disease.     Review of Systems  Review of Systems   Constitutional: Positive for activity change and fatigue. Negative for appetite change, chills, diaphoresis and fever.   HENT: Negative for congestion, postnasal drip, rhinorrhea, sinus pressure, sinus pain, sneezing, sore throat, trouble swallowing and voice change.    Respiratory: Positive for shortness of breath. Negative for cough, choking, chest tightness, wheezing and stridor.    Cardiovascular: Negative for chest pain.   Gastrointestinal: Negative for diarrhea, nausea and vomiting.   Musculoskeletal: Positive for arthralgias.   Neurological: Positive for weakness and numbness. Negative for headaches.   Psychiatric/Behavioral: The patient is nervous/anxious.         Depressed mood        Patient Active Problem List   Diagnosis   • Tobacco user   • Essential hypertension   • Mixed hyperlipidemia   • Morbid (severe) obesity due to excess calories   • Tobacco abuse counseling   • Chronic anticoagulation   •  "Longstanding persistent atrial fibrillation   • Type 2 diabetes mellitus with diabetic neuropathy, with long-term current use of insulin   • Diabetic foot   • Bunion   • Encounter for screening for malignant neoplasm of colon   • Chronic atrial fibrillation   • Gastroesophageal reflux disease   • Uncontrolled type 2 diabetes mellitus with hypoglycemia without coma   • Polycythemia   • Nicotine dependence with nicotine-induced disorder   • Class 2 severe obesity with serious comorbidity and body mass index (BMI) of 37.0 to 37.9 in adult     Past Surgical History:   Procedure Laterality Date   • CARDIAC ABLATION     • COLONOSCOPY N/A 9/2/2022    Procedure: COLONOSCOPY;  Surgeon: Jose Jaffe MD;  Location: [facility] ENDOSCOPY;  Service: Gastroenterology;  Laterality: N/A;     Social History     Socioeconomic History   • Marital status:    Tobacco Use   • Smoking status: Every Day     Packs/day: 1.00     Years: 35.00     Pack years: 35.00     Types: Cigarettes   • Smokeless tobacco: Former     Types: Chew   Vaping Use   • Vaping Use: Former   • Substances: Nicotine   • Devices: Refillable tank   Substance and Sexual Activity   • Alcohol use: Not Currently   • Drug use: Not Currently     Types: \"Crack\" cocaine, Marijuana   • Sexual activity: Defer     Comment: .     Family History   Problem Relation Age of Onset   • Heart disease Mother    • Kidney disease Father    • Heart disease Father    • Kidney disease Sister    • Diabetes Other    • Cancer Other    • Stroke Other    • Hypertension Other    • Allergy (severe) Other    • Cholelithiasis Other      No visits with results within 6 Month(s) from this visit.   Latest known visit with results is:   Lab on 10/24/2022   Component Date Value Ref Range Status   • WBC 10/24/2022 10.21  3.40 - 10.80 10*3/mm3 Final   • RBC 10/24/2022 6.38 (H)  4.14 - 5.80 10*6/mm3 Final   • Hemoglobin 10/24/2022 18.7 (H)  13.0 - 17.7 g/dL Final   • Hematocrit 10/24/2022 52.9 (H)  " 37.5 - 51.0 % Final   • MCV 10/24/2022 82.9  79.0 - 97.0 fL Final   • MCH 10/24/2022 29.3  26.6 - 33.0 pg Final   • MCHC 10/24/2022 35.3  31.5 - 35.7 g/dL Final   • RDW 10/24/2022 14.3  12.3 - 15.4 % Final   • RDW-SD 10/24/2022 41.6  37.0 - 54.0 fl Final   • MPV 10/24/2022 10.5  6.0 - 12.0 fL Final   • Platelets 10/24/2022 304  140 - 450 10*3/mm3 Final   • Neutrophil % 10/24/2022 52.5  42.7 - 76.0 % Final   • Lymphocyte % 10/24/2022 37.0  19.6 - 45.3 % Final   • Monocyte % 10/24/2022 7.5  5.0 - 12.0 % Final   • Eosinophil % 10/24/2022 1.6  0.3 - 6.2 % Final   • Basophil % 10/24/2022 0.9  0.0 - 1.5 % Final   • Immature Grans % 10/24/2022 0.5  0.0 - 0.5 % Final   • Neutrophils, Absolute 10/24/2022 5.36  1.70 - 7.00 10*3/mm3 Final   • Lymphocytes, Absolute 10/24/2022 3.78 (H)  0.70 - 3.10 10*3/mm3 Final   • Monocytes, Absolute 10/24/2022 0.77  0.10 - 0.90 10*3/mm3 Final   • Eosinophils, Absolute 10/24/2022 0.16  0.00 - 0.40 10*3/mm3 Final   • Basophils, Absolute 10/24/2022 0.09  0.00 - 0.20 10*3/mm3 Final   • Immature Grans, Absolute 10/24/2022 0.05  0.00 - 0.05 10*3/mm3 Final   • nRBC 10/24/2022 0.1  0.0 - 0.2 /100 WBC Final   • Glucose 10/24/2022 120 (H)  65 - 99 mg/dL Final   • BUN 10/24/2022 17  6 - 20 mg/dL Final   • Creatinine 10/24/2022 0.73 (L)  0.76 - 1.27 mg/dL Final   • Sodium 10/24/2022 139  136 - 145 mmol/L Final   • Potassium 10/24/2022 5.0  3.5 - 5.2 mmol/L Final   • Chloride 10/24/2022 100  98 - 107 mmol/L Final   • CO2 10/24/2022 27.9  22.0 - 29.0 mmol/L Final   • Calcium 10/24/2022 9.2  8.6 - 10.5 mg/dL Final   • Total Protein 10/24/2022 7.3  6.0 - 8.5 g/dL Final   • Albumin 10/24/2022 4.30  3.50 - 5.20 g/dL Final   • ALT (SGPT) 10/24/2022 18  1 - 41 U/L Final   • AST (SGOT) 10/24/2022 18  1 - 40 U/L Final   • Alkaline Phosphatase 10/24/2022 108  39 - 117 U/L Final   • Total Bilirubin 10/24/2022 0.4  0.0 - 1.2 mg/dL Final   • Globulin 10/24/2022 3.0  gm/dL Final   • A/G Ratio 10/24/2022 1.4  g/dL  "Final   • BUN/Creatinine Ratio 10/24/2022 23.3  7.0 - 25.0 Final   • Anion Gap 10/24/2022 11.1  5.0 - 15.0 mmol/L Final   • eGFR 10/24/2022 112.2  >60.0 mL/min/1.73 Final    National Kidney Foundation and American Society of Nephrology (ASN) Task Force recommended calculation based on the Chronic Kidney Disease Epidemiology Collaboration (CKD-EPI) equation refit without adjustment for race.   • Hemoglobin A1C 10/24/2022 8.30 (H)  4.80 - 5.60 % Final   • Total Cholesterol 10/24/2022 119  0 - 200 mg/dL Final   • Triglycerides 10/24/2022 218 (H)  0 - 150 mg/dL Final   • HDL Cholesterol 10/24/2022 29 (L)  40 - 60 mg/dL Final   • LDL Cholesterol  10/24/2022 55  0 - 100 mg/dL Final   • VLDL Cholesterol 10/24/2022 35  5 - 40 mg/dL Final   • LDL/HDL Ratio 10/24/2022 1.60   Final   • TSH 10/24/2022 3.270  0.270 - 4.200 uIU/mL Final   • 25 Hydroxy, Vitamin D 10/24/2022 13.9 (L)  30.0 - 100.0 ng/ml Final   • Vitamin B-12 10/24/2022 283  211 - 946 pg/mL Final      No image results found.    [unfilled]  Immunization History   Administered Date(s) Administered   • COVID-19 (MODERNA) 1st,2nd,3rd Dose Monovalent 06/07/2021, 07/12/2021   • COVID-19 (MODERNA) Monovalent Original Booster 02/07/2022   • FluLaval/Fluzone >6mos 10/19/2022   • Influenza, Unspecified 10/19/2022   • Tdap 05/13/2020       The following portions of the patient's history were reviewed and updated as appropriate: allergies, current medications, past family history, past medical history, past social history, past surgical history and problem list.        Physical Exam  /58 (BP Location: Right arm, Patient Position: Sitting, Cuff Size: Large Adult)   Pulse 70   Temp 97.8 °F (36.6 °C)   Ht 177.8 cm (70\")   Wt 120 kg (264 lb 12.8 oz)   SpO2 96%   BMI 37.99 kg/m²         Physical Exam  Vitals and nursing note reviewed.   Constitutional:       Appearance: He is well-developed. He is not diaphoretic.   HENT:      Head: Normocephalic and atraumatic.      " Right Ear: External ear normal.   Eyes:      Conjunctiva/sclera: Conjunctivae normal.      Pupils: Pupils are equal, round, and reactive to light.   Cardiovascular:      Rate and Rhythm: Normal rate and regular rhythm.      Heart sounds: Normal heart sounds. No murmur heard.  Pulmonary:      Effort: Pulmonary effort is normal. No respiratory distress.      Comments: Decreased breath sounds   Abdominal:      General: Bowel sounds are normal. There is no distension.      Palpations: Abdomen is soft.      Tenderness: There is no abdominal tenderness.      Comments: Obese abdomen    Musculoskeletal:         General: Tenderness present.      Right elbow: Deformity present. Decreased range of motion. Tenderness present.      Cervical back: Normal range of motion and neck supple.   Feet:      Comments: Callus or neoplasm on right foot/toe   Skin:     General: Skin is warm.      Coloration: Skin is not pale.      Findings: No erythema or rash.   Neurological:      Mental Status: He is alert and oriented to person, place, and time.      Cranial Nerves: No cranial nerve deficit.   Psychiatric:         Behavior: Behavior normal.         [unfilled]   Diagnosis Plan   1. Right elbow pain  XR Elbow 3+ View Right      2. Tobacco user        3. Uncontrolled type 2 diabetes mellitus with hypoglycemia without coma        4. Mixed hyperlipidemia        5. Morbid (severe) obesity due to excess calories        6. Chronic atrial fibrillation        7. Chronic anticoagulation        8. Essential hypertension        9. Gastroesophageal reflux disease, unspecified whether esophagitis present        10. Type 2 diabetes mellitus with diabetic neuropathy, with long-term current use of insulin  gabapentin (Neurontin) 800 MG tablet    Urine Drug Screen - Urine, Clean Catch      11. Class 2 severe obesity with serious comorbidity and body mass index (BMI) of 37.0 to 37.9 in adult, unspecified obesity type        12. Nicotine dependence with  nicotine-induced disorder, unspecified nicotine product type                  -recommend labwork   -recommend diabetic eye exam  -right elbow pain - x-ray of elbow. Consider Orthopedic referral and/or PT/OT  -diabetic foot care/onychomycosis - Podiatry following. He will need to make an appt   -multiple joint pain -  On celebrex.    -right elbow pain - likely golfer's elbow. Will get x-ray of right elbow. Consider Orthopedic or PT/OT referral   -atrial fibrillation- on atenolol 25 mg pO BId on elqiuis 5 mg PO q daily.   Referred to Cardiology   -allergic rhinitis - on claritin 10 mg PO q daily.   -HTN-  On atenolol 25 mg PO BID. On lisinopril 2.5 mg daily.   -DM type 2 - on Lantus 80  units , novolog injections on metformin  mg PO q daily on jardiance 25 mg daily.   he has yet to take rybelsus. Gave samples today 3 mg daily  - obesity - counseled weight loss >5 minutes BMI at 37.99  -HLP- on lopid 600 mg daily. On lipitor 40 mg PO qhs Check lipid panel recommend heart healthy diet  -COPD- referred to Pulmonology. Advised to quit smoking. Referred  to Dr. Bhatti on albuterol PRN.    -DANYA on Cpap - referred to sleep medicine. Will refer to [facility]   -diabetic neuropathy - on neurontin 600 mg PO TID. Will go up to 800 mg PO TID   -tobacco smoker - counseled quit smoking >5 minutes recommend 1 800 QUIT NOW recommend nicotine replacement therapy   -GERD - on protonix 20 mg daily.   -advised pt to be safe and call with questions and concerns  -advised pt to go to ER or call 911 if symptoms worrisome or severe  -advised pt to followup with specialist and referrals  -advised pt to be safe during COVID-19 pandemic  I spent 36  minutes caring for Shun on this date of service. This time includes time spent by me in the following activities: preparing for the visit, reviewing tests, obtaining and/or reviewing a separately obtained history, performing a medically appropriate examination and/or evaluation,  counseling and educating the patient/family/caregiver, ordering medications, tests, or procedures, referring and communicating with other health care professionals, documenting information in the medical record, independently interpreting results and communicating that information with the patient/family/caregiver and care coordination.         This document has been electronically signed by Prince Tobin MD on April 27, 2023 14:27 CDT

## 2023-04-21 NOTE — PATIENT INSTRUCTIONS
Please get labwork fasting    Please call Cardiology for an appt     Recheck in 3 months     Please call Dr. Munoz with Pulmonolog.

## 2023-04-27 ENCOUNTER — OFFICE VISIT (OUTPATIENT)
Dept: FAMILY MEDICINE CLINIC | Facility: CLINIC | Age: 48
End: 2023-04-27
Payer: MEDICAID

## 2023-04-27 VITALS
SYSTOLIC BLOOD PRESSURE: 112 MMHG | OXYGEN SATURATION: 96 % | TEMPERATURE: 97.8 F | BODY MASS INDEX: 37.91 KG/M2 | WEIGHT: 264.8 LBS | DIASTOLIC BLOOD PRESSURE: 58 MMHG | HEIGHT: 70 IN | HEART RATE: 70 BPM

## 2023-04-27 DIAGNOSIS — Z79.4 TYPE 2 DIABETES MELLITUS WITH DIABETIC NEUROPATHY, WITH LONG-TERM CURRENT USE OF INSULIN: ICD-10-CM

## 2023-04-27 DIAGNOSIS — M25.521 RIGHT ELBOW PAIN: Primary | ICD-10-CM

## 2023-04-27 DIAGNOSIS — E11.649 UNCONTROLLED TYPE 2 DIABETES MELLITUS WITH HYPOGLYCEMIA WITHOUT COMA: ICD-10-CM

## 2023-04-27 DIAGNOSIS — E78.2 MIXED HYPERLIPIDEMIA: ICD-10-CM

## 2023-04-27 DIAGNOSIS — I10 ESSENTIAL HYPERTENSION: ICD-10-CM

## 2023-04-27 DIAGNOSIS — I48.20 CHRONIC ATRIAL FIBRILLATION: ICD-10-CM

## 2023-04-27 DIAGNOSIS — K21.9 GASTROESOPHAGEAL REFLUX DISEASE, UNSPECIFIED WHETHER ESOPHAGITIS PRESENT: ICD-10-CM

## 2023-04-27 DIAGNOSIS — E11.40 TYPE 2 DIABETES MELLITUS WITH DIABETIC NEUROPATHY, WITH LONG-TERM CURRENT USE OF INSULIN: ICD-10-CM

## 2023-04-27 DIAGNOSIS — Z79.01 CHRONIC ANTICOAGULATION: ICD-10-CM

## 2023-04-27 DIAGNOSIS — E66.01 MORBID (SEVERE) OBESITY DUE TO EXCESS CALORIES: ICD-10-CM

## 2023-04-27 DIAGNOSIS — F17.209 NICOTINE DEPENDENCE WITH NICOTINE-INDUCED DISORDER, UNSPECIFIED NICOTINE PRODUCT TYPE: ICD-10-CM

## 2023-04-27 DIAGNOSIS — Z72.0 TOBACCO USER: ICD-10-CM

## 2023-04-27 DIAGNOSIS — E66.01 CLASS 2 SEVERE OBESITY WITH SERIOUS COMORBIDITY AND BODY MASS INDEX (BMI) OF 37.0 TO 37.9 IN ADULT, UNSPECIFIED OBESITY TYPE: ICD-10-CM

## 2023-04-27 PROBLEM — E66.812 CLASS 2 SEVERE OBESITY WITH SERIOUS COMORBIDITY AND BODY MASS INDEX (BMI) OF 37.0 TO 37.9 IN ADULT: Status: ACTIVE | Noted: 2023-04-27

## 2023-04-27 RX ORDER — ORAL SEMAGLUTIDE 3 MG/1
3 TABLET ORAL DAILY
Qty: 30 TABLET | Refills: 3 | Status: SHIPPED | OUTPATIENT
Start: 2023-04-27

## 2023-04-27 RX ORDER — GABAPENTIN 800 MG/1
800 TABLET ORAL 3 TIMES DAILY
Qty: 90 TABLET | Refills: 2 | Status: SHIPPED | OUTPATIENT
Start: 2023-04-27

## 2023-06-15 RX ORDER — INSULIN GLARGINE 100 [IU]/ML
110 INJECTION, SOLUTION SUBCUTANEOUS NIGHTLY
Qty: 33 ML | Refills: 3 | Status: SHIPPED | OUTPATIENT
Start: 2023-06-15

## 2023-06-20 ENCOUNTER — TELEPHONE (OUTPATIENT)
Dept: FAMILY MEDICINE CLINIC | Facility: CLINIC | Age: 48
End: 2023-06-20

## 2023-06-20 NOTE — TELEPHONE ENCOUNTER
Patient called and states that he is out of his Lantus and the refill that was sent on June 13, 2023 needed a PA on it.  Patient would like a return call

## 2023-06-20 NOTE — TELEPHONE ENCOUNTER
Patient said that he cannot take tresiba or any of the others you suggested, He said that is why he is on the one he is on he had vomiting with all of these, I offered samples and he said he cannot take them. So im gonna see if I can call the insurance and see what's going on with authorization , its been a week I guess

## 2023-06-20 NOTE — TELEPHONE ENCOUNTER
Patient called and states that he is out of his Lantus and the refill that was sent on June 13, 2023 needed a PA on it.  Patient would like a return call    I checked on this and authorization is in process but patient is out of this and the pharmacy said they can give him the generic but he has side affects to a lot of insulins. They are not sure what to do he's been out for over a week now

## 2023-06-20 NOTE — TELEPHONE ENCOUNTER
Would you have any samples of tresbia to give him until we here from insurance if not you would have to send over a script on each one to see which one is covered. Or send the generic for the one he is waiting on .

## 2023-09-11 ENCOUNTER — OFFICE VISIT (OUTPATIENT)
Dept: FAMILY MEDICINE CLINIC | Facility: CLINIC | Age: 48
End: 2023-09-11
Payer: MEDICAID

## 2023-09-11 VITALS
TEMPERATURE: 98.4 F | HEIGHT: 70 IN | HEART RATE: 71 BPM | OXYGEN SATURATION: 99 % | WEIGHT: 272 LBS | BODY MASS INDEX: 38.94 KG/M2 | DIASTOLIC BLOOD PRESSURE: 68 MMHG | SYSTOLIC BLOOD PRESSURE: 140 MMHG

## 2023-09-11 DIAGNOSIS — S80.812A ABRASION OF ANTERIOR LEFT LOWER LEG, INITIAL ENCOUNTER: ICD-10-CM

## 2023-09-11 DIAGNOSIS — S20.212A CHEST WALL CONTUSION, LEFT, INITIAL ENCOUNTER: ICD-10-CM

## 2023-09-11 DIAGNOSIS — S80.12XA CONTUSION OF LEFT LOWER LEG, INITIAL ENCOUNTER: ICD-10-CM
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
## 2023-09-11 PROCEDURE — 1159F MED LIST DOCD IN RCRD: CPT | Performed by: NURSE PRACTITIONER

## 2023-09-11 PROCEDURE — 99213 OFFICE O/P EST LOW 20 MIN: CPT | Performed by: NURSE PRACTITIONER

## 2023-09-11 PROCEDURE — 1160F RVW MEDS BY RX/DR IN RCRD: CPT | Performed by: NURSE PRACTITIONER

## 2023-09-11 PROCEDURE — 3078F DIAST BP <80 MM HG: CPT | Performed by: NURSE PRACTITIONER

## 2023-09-11 PROCEDURE — 3077F SYST BP >= 140 MM HG: CPT | Performed by: NURSE PRACTITIONER

## 2023-09-11 NOTE — LETTER
September 15, 2023     Patient: Shun Coleman   YOB: 1975   Date of Visit: 9/11/2023       To Whom It May Concern:    It is my medical opinion that Shun Coleman may RTW on 9- without restrictions.           Sincerely,      This document has been electronically signed by UBALDO Wilcox on September 15, 2023 07:42 CDT,.      UBALDO Wilcox    CC: No Recipients

## 2023-09-11 NOTE — LETTER
September 11, 2023     Patient: Shun Coleman   YOB: 1975   Date of Visit: 9/11/2023       To Whom It May Concern:    It is my medical opinion that Shun Coleman may return to work on today (9-) with restrictions of no lifting >15 pounds x 2 weeks.        Sincerely,    This document has been electronically signed by UBALDO Wilcox on September 11, 2023 09:49 CDT,.        UBALDO Wilcox    CC: No Recipients

## 2023-09-11 NOTE — PROGRESS NOTES
"Chief Complaint  Pain (Pt states that they been having pain on their left side from falling at home. X 4-5 days.)    Subjective          Shun Coleman presents to [facility] [place]    History of Present Illness  [facility]      PCP: Dr. Tobin    CC: \"pain after fall\"  Fall  The accident occurred 3 to 5 days ago (9-6-2023). Fall occurred: walking down outdoor stips. He landed on [place]. The volume of blood lost was minimal. Point of impact: left ribs; left lower leg. Pain location: left ribs started after fall; left leg pain initially, but improved. The pain is at a severity of 5/10. The symptoms are aggravated by pressure on injury, rotation and flexion. Pertinent negatives include no abdominal pain, bowel incontinence, fever, headaches, hearing loss, hematuria, loss of consciousness, nausea, numbness, tingling, visual change or vomiting. He has tried acetaminophen for the symptoms. The treatment provided no relief.     Review of Systems   Constitutional: Negative.  Negative for fever.   Respiratory: Negative.     Cardiovascular: Negative.    Gastrointestinal: Negative.  Negative for abdominal pain, bowel incontinence, nausea and vomiting.   Genitourinary:  Negative for hematuria.   Musculoskeletal:         Left chest wall     Skin:  Positive for wound (abrasion left lower leg).        +bruising     Neurological:  Negative for dizziness, tingling, loss of consciousness, numbness and headaches.      Objective   Vital Signs:   /68 (BP Location: Right arm, Patient Position: Sitting, Cuff Size: Large Adult)   Pulse 71   Temp 98.4 °F (36.9 °C) (Temporal)   Ht 177.8 cm (70\")   Wt 123 kg (272 lb)   SpO2 99%   BMI 39.03 kg/m²       Physical Exam  Vitals and nursing note reviewed.   Constitutional:       General: He is not in acute distress.     Appearance: He is not ill-appearing.   HENT:      Head: Normocephalic and atraumatic. "   Cardiovascular:      Rate and Rhythm: Normal rate and regular rhythm.   Pulmonary:      Effort: Pulmonary effort is normal. No respiratory distress.      Breath sounds: Wheezing (scattered/clear with cough) and rhonchi (scattered, clear with cough) present. No rales.   Chest:      Chest wall: Swelling and tenderness present.       Musculoskeletal:      Cervical back: Neck supple.   Skin:     General: Skin is warm and dry.      Findings: Bruising (LLE, left chest wall) present.          Neurological:      General: No focal deficit present.      Mental Status: He is alert and oriented to person, place, and time.   Psychiatric:         Mood and Affect: Mood normal.         Thought Content: Thought content normal.        Result Review :     Common labs          10/24/2022    08:04   Common Labs   Glucose 120    BUN 17    Creatinine 0.73    Sodium 139    Potassium 5.0    Chloride 100    Calcium 9.2    Albumin 4.30    Total Bilirubin 0.4    Alkaline Phosphatase 108    AST (SGOT) 18    ALT (SGPT) 18    WBC 10.21    Hemoglobin 18.7    Hematocrit 52.9    Platelets 304    Total Cholesterol 119    Triglycerides 218    HDL Cholesterol 29    LDL Cholesterol  55    Hemoglobin A1C 8.30      INDICATION:  Fall.     FINDINGS:  Single view of the chest is normal.     Multiple views of the left thoracic cage demonstrate no evidence of fracture.     SUMMARY:  No abnormalities demonstrated.               Assessment and Plan    Diagnoses and all orders for this visit:    1. Fall, initial encounter (Primary)  -     XR Ribs Left With PA Chest    2. Contusion of left lower leg, initial encounter    3. Abrasion of anterior left lower leg, initial encounter    4. Chest wall contusion, left, initial encounter    Left rib xrays show no fracture.  Declines LLE xrays as pain has improved.   Tylenol PRN.   Splint left side with coughing.    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   Keep abrasion area clean.  Monitor for s/s of secondary infection.     See PCP or RTC if symptoms persist/worsen  See PCP for routine f/u visit and management of chronic medical conditions    This document has been electronically signed by UBALDO Wilcox on September 11, 2023 12:37 CDT,.

## 2023-09-13 ENCOUNTER — TELEPHONE (OUTPATIENT)
Dept: FAMILY MEDICINE CLINIC | Facility: CLINIC | Age: 48
End: 2023-09-13

## 2023-09-13 NOTE — TELEPHONE ENCOUNTER
Patient is calling saying he needs his work note revised to return Monday 9/18/2023.    Return call number is [phone]

## 2023-09-14 NOTE — TELEPHONE ENCOUNTER
Patient is wanting something that states he is out of work from 9/13 to return to work on 9/18. It does not have to have restrictions on it. Patient is needing to turn it into work by 6 pm today.    Please call when ready at [phone]

## 2023-09-21 NOTE — TELEPHONE ENCOUNTER
Diabetic Supplies Protocol [id number]/21/2023 10:14 AM   Protocol Details A1c in past 6 months

## 2025-07-02 ENCOUNTER — HOSPITAL ENCOUNTER (OUTPATIENT)
Dept: GENERAL RADIOLOGY | Facility: HOSPITAL | Age: 50
Discharge: HOME | End: 2025-07-02
Payer: COMMERCIAL

## 2025-07-02 ENCOUNTER — OFFICE VISIT (OUTPATIENT)
Dept: NEUROSURGERY | Facility: CLINIC | Age: 50
End: 2025-07-02
Payer: COMMERCIAL

## 2025-07-02 VITALS — BODY MASS INDEX: 42.95 KG/M2 | WEIGHT: 300 LBS | HEIGHT: 70 IN

## 2025-07-02 DIAGNOSIS — F17.200 SMOKER: ICD-10-CM

## 2025-07-02 DIAGNOSIS — G89.29 CHRONIC BILATERAL LOW BACK PAIN WITH LEFT-SIDED SCIATICA: Primary | ICD-10-CM

## 2025-07-02 DIAGNOSIS — E66.813 CLASS 3 SEVERE OBESITY DUE TO EXCESS CALORIES WITHOUT SERIOUS COMORBIDITY WITH BODY MASS INDEX (BMI) OF 40.0 TO 44.9 IN ADULT: ICD-10-CM

## 2025-07-02 DIAGNOSIS — M54.42 CHRONIC BILATERAL LOW BACK PAIN WITH LEFT-SIDED SCIATICA: ICD-10-CM

## 2025-07-02 DIAGNOSIS — M54.42 CHRONIC BILATERAL LOW BACK PAIN WITH LEFT-SIDED SCIATICA: Primary | ICD-10-CM

## 2025-07-02 DIAGNOSIS — M48.061 LUMBAR STENOSIS WITHOUT NEUROGENIC CLAUDICATION: ICD-10-CM

## 2025-07-02 DIAGNOSIS — G89.29 CHRONIC BILATERAL LOW BACK PAIN WITH LEFT-SIDED SCIATICA: ICD-10-CM

## 2025-07-02 PROCEDURE — 72082 X-RAY EXAM ENTIRE SPI 2/3 VW: CPT

## 2025-07-02 PROCEDURE — 72120 X-RAY BEND ONLY L-S SPINE: CPT

## 2025-07-02 RX ORDER — ONDANSETRON 4 MG/1
4 TABLET, ORALLY DISINTEGRATING ORAL EVERY 8 HOURS PRN
COMMUNITY
Start: 2025-06-20 | End: 2025-07-21

## 2025-07-02 RX ORDER — INSULIN GLARGINE 300 U/ML
INJECTION, SOLUTION SUBCUTANEOUS
COMMUNITY

## 2025-07-02 RX ORDER — HYDROCODONE BITARTRATE AND ACETAMINOPHEN 5; 325 MG/1; MG/1
1 TABLET ORAL EVERY 12 HOURS SCHEDULED
COMMUNITY
Start: 2025-06-11

## 2025-07-02 RX ORDER — NITROGLYCERIN 0.4 MG/1
0.4 TABLET SUBLINGUAL
COMMUNITY
Start: 2025-06-24 | End: 2026-06-25

## 2025-07-02 RX ORDER — NICOTINE 10 MG/ML
SPRAY, METERED NASAL
COMMUNITY
Start: 2025-04-04

## 2025-07-02 RX ORDER — ATORVASTATIN CALCIUM 80 MG/1
80 TABLET, FILM COATED ORAL DAILY
COMMUNITY
Start: 2025-05-19

## 2025-07-02 RX ORDER — ASPIRIN 81 MG/1
81 TABLET, CHEWABLE ORAL DAILY
COMMUNITY

## 2025-07-02 RX ORDER — ORAL SEMAGLUTIDE 7 MG/1
1 TABLET ORAL DAILY
COMMUNITY

## 2025-07-02 NOTE — PATIENT INSTRUCTIONS
"BMI for Adults  What is BMI?  Body mass index (BMI) is a number that is calculated from a person's weight and height. BMI can help estimate how much of a person's weight is composed of fat. BMI does not measure body fat directly. Rather, it is an alternative to procedures that directly measure body fat, which can be difficult and expensive.  BMI can help identify people who may be at higher risk for certain medical problems.  What are BMI measurements used for?  BMI is used as a screening tool to identify possible weight problems. It helps determine whether a person is obese, overweight, a healthy weight, or underweight.  BMI is useful for:  Identifying a weight problem that may be related to a medical condition or may increase the risk for medical problems.  Promoting changes, such as changes in diet and exercise, to help reach a healthy weight. BMI screening can be repeated to see if these changes are working.  How is BMI calculated?  BMI involves measuring your weight in relation to your height. Both height and weight are measured, and the BMI is calculated from those numbers. This can be done either in English (U[place] or metric measurements. Note that charts and online BMI calculators are available to help you find your BMI quickly and easily without having to do these calculations yourself.  To calculate your BMI in English ([place] measurements:    Measure your weight in pounds (lb).  Multiply the number of pounds by 703.  For example, for a person who weighs 180 lb, multiply that number by 703, which equals 126,540.  Measure your height in inches. Then multiply that number by itself to get a measurement called \"inches squared.\"  For example, for a person who is 70 inches tall, the \"inches squared\" measurement is 70 inches x 70 inches, which equals 4,900 inches squared.  Divide the total from step 2 (number of lb x 703) by the total from step 3 (inches squared): 126,540 ÷ 4,900 = 25.8. This is your BMI.    To " "calculate your BMI in metric measurements:  Measure your weight in kilograms (kg).  Measure your height in meters (m). Then multiply that number by itself to get a measurement called \"meters squared.\"  For example, for a person who is 1.75 m tall, the \"meters squared\" measurement is 1.75 m x 1.75 m, which is equal to 3.1 meters squared.  Divide the number of kilograms (your weight) by the meters squared number. In this example: 70 ÷ 3.1 = 22.6. This is your BMI.  What do the results mean?  BMI charts are used to identify whether you are underweight, normal weight, overweight, or obese. The following guidelines will be used:  Underweight: BMI less than 18.5.  Normal weight: BMI between 18.5 and 24.9.  Overweight: BMI between 25 and 29.9.  Obese: BMI of 30 or above.  Keep these notes in mind:  Weight includes both fat and muscle, so someone with a muscular build, such as an athlete, may have a BMI that is higher than 24.9. In cases like these, BMI is not an accurate measure of body fat.  To determine if excess body fat is the cause of a BMI of 25 or higher, further assessments may need to be done by a health care provider.  BMI is usually interpreted in the same way for men and women.  Where to find more information  For more information about BMI, including tools to quickly calculate your BMI, go to these websites:  Centers for Disease Control and Prevention: www.cdc.gov  American Heart Association: www.heart.org  National Heart, Lung, and Blood [place]: www.nhlbi.nih.gov  Summary  Body mass index (BMI) is a number that is calculated from a person's weight and height.  BMI may help estimate how much of a person's weight is composed of fat. BMI can help identify those who may be at higher risk for certain medical problems.  BMI can be measured using English measurements or metric measurements.  BMI charts are used to identify whether you are underweight, normal weight, overweight, or obese.  This information is not " "intended to replace advice given to you by your health care provider. Make sure you discuss any questions you have with your health care provider.  Document Revised: 09/09/2020 Document Reviewed: 07/17/2020  [employer] Patient Education © 2021 [employer] https://www.nhlbi.nih.gov/files/docs/public/heart/dash_brief.pdf\">   DASH Eating Plan  DASH stands for Dietary Approaches to Stop Hypertension. The DASH eating plan is a healthy eating plan that has been shown to:  Reduce high blood pressure (hypertension).  Reduce your risk for type 2 diabetes, heart disease, and stroke.  Help with weight loss.  What are tips for following this plan?  Reading food labels  Check food labels for the amount of salt (sodium) per serving. Choose foods with less than 5 percent of the Daily Value of sodium. Generally, foods with less than 300 milligrams (mg) of sodium per serving fit into this eating plan.  To find whole grains, look for the word \"whole\" as the first word in the ingredient list.  Shopping  Buy products labeled as \"low-sodium\" or \"no salt added.\"  Buy fresh foods. Avoid canned foods and pre-made or frozen meals.  Cooking  Avoid adding salt when cooking. Use salt-free seasonings or herbs instead of table salt or sea salt. Check with your health care provider or pharmacist before using salt substitutes.  Do not schneider foods. Cook foods using healthy methods such as baking, boiling, grilling, roasting, and broiling instead.  Cook with heart-healthy oils, such as olive, canola, avocado, soybean, or sunflower oil.  Meal planning    Eat a balanced diet that includes:  4 or more servings of fruits and 4 or more servings of vegetables each day. Try to fill one-half of your plate with fruits and vegetables.  6-8 servings of whole grains each day.  Less than 6 oz (170 g) of lean meat, poultry, or fish each day. A 3-oz (85-g) serving of meat is about the same size as a deck of cards. One egg equals 1 oz (28 g).  2-3 servings of low-fat " dairy each day. One serving is 1 cup (237 mL).  1 serving of nuts, seeds, or beans 5 times each week.  2-3 servings of heart-healthy fats. Healthy fats called omega-3 fatty acids are found in foods such as walnuts, flaxseeds, fortified milks, and eggs. These fats are also found in cold-water fish, such as sardines, salmon, and mackerel.  Limit how much you eat of:  Canned or prepackaged foods.  Food that is high in trans fat, such as some fried foods.  Food that is high in saturated fat, such as fatty meat.  Desserts and other sweets, sugary drinks, and other foods with added sugar.  Full-fat dairy products.  Do not salt foods before eating.  Do not eat more than 4 egg yolks a week.  Try to eat at least 2 vegetarian meals a week.  Eat more home-cooked food and less restaurant, buffet, and fast food.    Lifestyle  When eating at a restaurant, ask that your food be prepared with less salt or no salt, if possible.  If you drink alcohol:  Limit how much you use to:  0-1 drink a day for women who are not pregnant.  0-2 drinks a day for men.  Be aware of how much alcohol is in your drink. In the [place] one drink equals one 12 oz bottle of beer (355 mL), one 5 oz glass of wine (148 mL), or one 1½ oz glass of hard liquor (44 mL).  General information  Avoid eating more than 2,300 mg of salt a day. If you have hypertension, you may need to reduce your sodium intake to 1,500 mg a day.  Work with your health care provider to maintain a healthy body weight or to lose weight. Ask what an ideal weight is for you.  Get at least 30 minutes of exercise that causes your heart to beat faster (aerobic exercise) most days of the week. Activities may include walking, swimming, or biking.  Work with your health care provider or dietitian to adjust your eating plan to your individual calorie needs.  What foods should I eat?  Fruits  All fresh, dried, or frozen fruit. Canned fruit in natural juice (without added sugar).  Vegetables  Fresh or  frozen vegetables (raw, steamed, roasted, or grilled). Low-sodium or reduced-sodium tomato and vegetable juice. Low-sodium or reduced-sodium tomato sauce and tomato paste. Low-sodium or reduced-sodium canned vegetables.  Grains  Whole-grain or whole-wheat bread. Whole-grain or whole-wheat pasta. Brown rice. Oatmeal. Quinoa. Bulgur. Whole-grain and low-sodium cereals. Marjorie bread. Low-fat, low-sodium crackers. Whole-wheat flour tortillas.  Meats and other proteins  Skinless chicken or turkey. Ground chicken or turkey. Pork with fat trimmed off. Fish and seafood. Egg whites. Dried beans, peas, or lentils. Unsalted nuts, nut butters, and seeds. Unsalted canned beans. Lean cuts of beef with fat trimmed off. Low-sodium, lean precooked or cured meat, such as sausages or meat loaves.  Dairy  Low-fat (1%) or fat-free (skim) milk. Reduced-fat, low-fat, or fat-free cheeses. Nonfat, low-sodium ricotta or cottage cheese. Low-fat or nonfat yogurt. Low-fat, low-sodium cheese.  Fats and oils  Soft margarine without trans fats. Vegetable oil. Reduced-fat, low-fat, or light mayonnaise and salad dressings (reduced-sodium). Canola, safflower, olive, avocado, soybean, and sunflower oils. Avocado.  Seasonings and condiments  Herbs. Spices. Seasoning mixes without salt.  Other foods  Unsalted popcorn and pretzels. Fat-free sweets.  The items listed above may not be a complete list of foods and beverages you can eat. Contact a dietitian for more information.  What foods should I avoid?  Fruits  Canned fruit in a light or heavy syrup. Fried fruit. Fruit in cream or butter sauce.  Vegetables  Creamed or fried vegetables. Vegetables in a cheese sauce. Regular canned vegetables (not low-sodium or reduced-sodium). Regular canned tomato sauce and paste (not low-sodium or reduced-sodium). Regular tomato and vegetable juice (not low-sodium or reduced-sodium). Pickles. Olives.  Grains  Baked goods made with fat, such as croissants, muffins, or some  breads. Dry pasta or rice meal packs.  Meats and other proteins  Fatty cuts of meat. Ribs. Fried meat. Gaona. Bologna, salami, and other precooked or cured meats, such as sausages or meat loaves. Fat from the back of a pig (fatback). Bratwurst. Salted nuts and seeds. Canned beans with added salt. Canned or smoked fish. Whole eggs or egg yolks. Chicken or turkey with skin.  Dairy  Whole or 2% milk, cream, and half-and-half. Whole or full-fat cream cheese. Whole-fat or sweetened yogurt. Full-fat cheese. Nondairy creamers. Whipped toppings. Processed cheese and cheese spreads.  Fats and oils  Butter. Stick margarine. Lard. Shortening. Ghee. Gaona fat. Tropical oils, such as coconut, palm kernel, or palm oil.  Seasonings and condiments  Onion salt, garlic salt, seasoned salt, table salt, and sea salt. Worcestershire sauce. Tartar sauce. Barbecue sauce. Teriyaki sauce. Soy sauce, including reduced-sodium. Steak sauce. Canned and packaged gravies. Fish sauce. Oyster sauce. Cocktail sauce. Store-bought horseradish. Ketchup. Mustard. Meat flavorings and tenderizers. Bouillon cubes. Hot sauces. Pre-made or packaged marinades. Pre-made or packaged taco seasonings. Relishes. Regular salad dressings.  Other foods  Salted popcorn and pretzels.  The items listed above may not be a complete list of foods and beverages you should avoid. Contact a dietitian for more information.  Where to find more information  National Heart, Lung, and Blood [place]: www.nhlbi.nih.gov  American Heart Association: www.heart.org  Academy of Nutrition and Dietetics: www.eatright.org  National Kidney Foundation: www.kidney.org  Summary  The DASH eating plan is a healthy eating plan that has been shown to reduce high blood pressure (hypertension). It may also reduce your risk for type 2 diabetes, heart disease, and stroke.  When on the DASH eating plan, aim to eat more fresh fruits and vegetables, whole grains, lean proteins, low-fat dairy, and  heart-healthy fats.  With the DASH eating plan, you should limit salt (sodium) intake to 2,300 mg a day. If you have hypertension, you may need to reduce your sodium intake to 1,500 mg a day.  Work with your health care provider or dietitian to adjust your eating plan to your individual calorie needs.  This information is not intended to replace advice given to you by your health care provider. Make sure you discuss any questions you have with your health care provider.  Document Revised: 11/20/2020 Document Reviewed: 11/20/2020  [employer] Patient Education © 2021 [employer] High-Protein and High-Calorie Diet  Eating high-protein and high-calorie foods can help you to gain weight, heal after an injury, and recover after an illness or surgery. The specific amount of daily protein and calories you need depends on:  Your body weight.  The reason this diet is recommended for you.  What is my plan?  Generally, a high-protein, high-calorie diet involves:  Eating 250-500 extra calories each day.  Making sure that you get enough of your daily calories from protein. Ask your health care provider how many of your calories should come from protein.  Talk with a health care provider, such as a diet and nutrition specialist (dietitian), about how much protein and how many calories you need each day. Follow the diet as directed by your health care provider.  What are tips for following this plan?    Preparing meals  Add whole milk, half-and-half, or heavy cream to cereal, pudding, soup, or hot cocoa.  Add whole milk to instant breakfast drinks.  Add peanut butter to oatmeal or smoothies.  Add powdered milk to baked goods, smoothies, or milkshakes.  Add powdered milk, cream, or butter to mashed potatoes.  Add cheese to cooked vegetables.  Make whole-milk yogurt parfaits. Top them with granola, fruit, or nuts.  Add cottage cheese to your fruit.  Add avocado, cheese, or both to sandwiches or salads.  Add meat, poultry, or seafood  to rice, pasta, casseroles, salads, and soups.  Use mayonnaise when making egg salad, chicken salad, or tuna salad.  Use peanut butter as a dip for vegetables or as a topping for pretzels, celery, or crackers.  Add beans to casseroles, dips, and spreads.  Add pureed beans to sauces and soups.  Replace calorie-free drinks with calorie-containing drinks, such as milk and fruit juice.  Replace water with milk or heavy cream when making foods such as oatmeal, pudding, or cocoa.  General instructions  Ask your health care provider if you should take a nutritional supplement.  Try to eat six small meals each day instead of three large meals.  Eat a balanced diet. In each meal, include one food that is high in protein.  Keep nutritious snacks available, such as nuts, trail mixes, dried fruit, and yogurt.  If you have kidney disease or diabetes, talk with your health care provider about how much protein is safe for you. Too much protein may put extra stress on your kidneys.  Drink your calories. Choose high-calorie drinks and have them after your meals.  What high-protein foods should I eat?    Vegetables  Soybeans. Peas.  Grains  Quinoa. Bulgur wheat.  Meats and other proteins  Beef, pork, and poultry. Fish and seafood. Eggs. Tofu. Textured vegetable protein (TVP). Peanut butter. Nuts and seeds. Dried beans. Protein powders.  Dairy  Whole milk. Whole-milk yogurt. Powdered milk. Cheese. Cottage Cheese. Eggnog.  Beverages  High-protein supplement drinks. Soy milk.  Other foods  Protein bars.  The items listed above may not be a complete list of high-protein foods and beverages. Contact a dietitian for more options.  What high-calorie foods should I eat?  Fruits  Dried fruit. Fruit leather. Canned fruit in syrup. Fruit juice. Avocado.  Vegetables  Vegetables cooked in oil or butter. Fried potatoes.  Grains  Pasta. Quick breads. Muffins. Pancakes. Ready-to-eat cereal.  Meats and other proteins  Peanut butter. Nuts and  seeds.  Dairy  Heavy cream. Whipped cream. Cream cheese. Sour cream. Ice cream. Custard. Pudding.  Beverages  Meal-replacement beverages. Nutrition shakes. Fruit juice. Sugar-sweetened soft drinks.  Seasonings and condiments  Salad dressing. Mayonnaise. Ramone sauce. Fruit preserves or jelly. Honey. Syrup.  Sweets and desserts  Cake. Cookies. Pie. Pastries. Candy bars. Chocolate.  Fats and oils  Butter or margarine. Oil. Gravy.  Other foods  Meal-replacement bars.  The items listed above may not be a complete list of high-calorie foods and beverages. Contact a dietitian for more options.  Summary  A high-protein, high-calorie diet can help you gain weight or heal faster after an injury, illness, or surgery.  To increase your protein and calories, add ingredients such as whole milk, peanut butter, cheese, beans, meat, or seafood to meal items.  To get enough extra calories each day, include high-calorie foods and beverages at each meal.  Adding a high-calorie drink or shake can be an easy way to help you get enough calories each day. Talk with your healthcare provider or dietitian about the best options for you.  This information is not intended to replace advice given to you by your health care provider. Make sure you discuss any questions you have with your health care provider.  Document Revised: 11/30/2018 Document Reviewed: 10/30/2018  [employer] Patient Education © 2021 [employer] For more information:    Quit Now [place]  1-800-QUIT-NOW  https://[place].quitlogix.org/en-[place]/  Steps to Quit Smoking  Smoking tobacco can be harmful to your health and can affect almost every organ in your body. Smoking puts you, and those around you, at risk for developing many serious chronic diseases. Quitting smoking is difficult, but it is one of the best things that you can do for your health. It is never too late to quit.  What are the benefits of quitting smoking?  When you quit smoking, you lower your risk of developing  serious diseases and conditions, such as:  Lung cancer or lung disease, such as COPD.  Heart disease.  Stroke.  Heart attack.  Infertility.  Osteoporosis and bone fractures.  Additionally, symptoms such as coughing, wheezing, and shortness of breath may get better when you quit. You may also find that you get sick less often because your body is stronger at fighting off colds and infections. If you are pregnant, quitting smoking can help to reduce your chances of having a baby of low birth weight.  How do I get ready to quit?  When you decide to quit smoking, create a plan to make sure that you are successful. Before you quit:  Pick a date to quit. Set a date within the next two weeks to give you time to prepare.  Write down the reasons why you are quitting. Keep this list in places where you will see it often, such as on your bathroom mirror or in your car or wallet.  Identify the people, places, things, and activities that make you want to smoke (triggers) and avoid them. Make sure to take these actions:  Throw away all cigarettes at home, at work, and in your car.  Throw away smoking accessories, such as ashtrays and lighters.  Clean your car and make sure to empty the ashtray.  Clean your home, including curtains and carpets.  Tell your family, friends, and coworkers that you are quitting. Support from your loved ones can make quitting easier.  Talk with your health care provider about your options for quitting smoking.  Find out what treatment options are covered by your health insurance.  What strategies can I use to quit smoking?  Talk with your healthcare provider about different strategies to quit smoking. Some strategies include:  Quitting smoking altogether instead of gradually lessening how much you smoke over a period of time. Research shows that quitting “cold turkey” is more successful than gradually quitting.  Attending in-person counseling to help you build problem-solving skills. You are more  likely to have success in quitting if you attend several counseling sessions. Even short sessions of 10 minutes can be effective.  Finding resources and support systems that can help you to quit smoking and remain smoke-free after you quit. These resources are most helpful when you use them often. They can include:  Online chats with a counselor.  Telephone quitlines.  Printed self-help materials.  Support groups or group counseling.  Text messaging programs.  Mobile phone applications.  Taking medicines to help you quit smoking. (If you are pregnant or breastfeeding, talk with your health care provider first.) Some medicines contain nicotine and some do not. Both types of medicines help with cravings, but the medicines that include nicotine help to relieve withdrawal symptoms. Your health care provider may recommend:  Nicotine patches, gum, or lozenges.  Nicotine inhalers or sprays.  Non-nicotine medicine that is taken by mouth.  Talk with your health care provider about combining strategies, such as taking medicines while you are also receiving in-person counseling. Using these two strategies together makes you more likely to succeed in quitting than if you used either strategy on its own.  If you are pregnant or breastfeeding, talk with your health care provider about finding counseling or other support strategies to quit smoking. Do not take medicine to help you quit smoking unless told to do so by your health care provider.  What things can I do to make it easier to quit?  Quitting smoking might feel overwhelming at first, but there is a lot that you can do to make it easier. Take these important actions:  Reach out to your family and friends and ask that they support and encourage you during this time. Call telephone quitlines, reach out to support groups, or work with a counselor for support.  Ask people who smoke to avoid smoking around you.  Avoid places that trigger you to smoke, such as bars, parties, or  smoke-break areas at work.  Spend time around people who do not smoke.  Lessen stress in your life, because stress can be a smoking trigger for some people. To lessen stress, try:  Exercising regularly.  Deep-breathing exercises.  Yoga.  Meditating.  Performing a body scan. This involves closing your eyes, scanning your body from head to toe, and noticing which parts of your body are particularly tense. Purposefully relax the muscles in those areas.  Download or purchase mobile phone or tablet apps (applications) that can help you stick to your quit plan by providing reminders, tips, and encouragement. There are many free apps, such as QuitGuide from the CDC (Centers for Disease Control and Prevention). You can find other support for quitting smoking (smoking cessation) through smokefree.gov and other websites.  How will I feel when I quit smoking?  Within the first 24 hours of quitting smoking, you may start to feel some withdrawal symptoms. These symptoms are usually most noticeable 2-3 days after quitting, but they usually do not last beyond 2-3 weeks. Changes or symptoms that you might experience include:  Mood swings.  Restlessness, anxiety, or irritation.  Difficulty concentrating.  Dizziness.  Strong cravings for sugary foods in addition to nicotine.  Mild weight gain.  Constipation.  Nausea.  Coughing or a sore throat.  Changes in how your medicines work in your body.  A depressed mood.  Difficulty sleeping (insomnia).  After the first 2-3 weeks of quitting, you may start to notice more positive results, such as:  Improved sense of smell and taste.  Decreased coughing and sore throat.  Slower heart rate.  Lower blood pressure.  Clearer skin.  The ability to breathe more easily.  Fewer sick days.  Quitting smoking is very challenging for most people. Do not get discouraged if you are not successful the first time. Some people need to make many attempts to quit before they achieve long-term success. Do your  best to stick to your quit plan, and talk with your health care provider if you have any questions or concerns.  This information is not intended to replace advice given to you by your health care provider. Make sure you discuss any questions you have with your health care provider.  Document Released: 12/12/2002 Document Revised: 08/15/2017 Document Reviewed: 05/03/2016  [employer] Patient Education © 2017 [employer]

## 2025-07-02 NOTE — PROGRESS NOTES
Chief complaint:   Chief Complaint   Patient presents with    Back Pain     Pt is here for midline-lbp. Pt states he has completed ([facility])physical therapy. Pt states he has not had any pain mgmt or seen a chiropractor.        Subjective     HPI: This is a 49-year-old male gentleman who was referred to us by Dr. Prince Tobin for back pain.  Is here to be evaluated today.  The patient says that his pain has been going on since around February 2025.  There was no accident or injury associated with this.  He says the pain in his back has been constant since that time.  Is worse with standing and better with sitting.  He does complain of some intermittent pain that goes over to his left thigh.  Denies any right-sided symptoms.  The main complaint is dealing with his back pain.  It does appear that he did a few sessions of physical therapy at [facility] without any significant improvement.  He said that after the therapy sessions he was in more pain than when he went in.  Is not done any chiropractic care pain management injections.  He does take Eliquis due to atrial fibrillation.  He is right-hand dominant.  He has not been able to work since February and is on short-term disability.  He has applied for disability.  He is .  He does smoke cigarettes.  He denies any tobacco or illicit drug use.    Review of Systems   Musculoskeletal:  Positive for arthralgias, back pain and myalgias.   Neurological: Negative.         Past Medical History:   Diagnosis Date    Allergic     Asthma     Atrial fibrillation     COPD (chronic obstructive pulmonary disease)     Diabetes mellitus     GERD (gastroesophageal reflux disease)     Hyperlipidemia     Hypertension     Low back pain     Sleep apnea      Past Surgical History:   Procedure Laterality Date    CARDIAC ABLATION      COLONOSCOPY N/A 9/2/2022    Procedure: COLONOSCOPY;  Surgeon: Jose Jaffe MD;  Location: [facility] ENDOSCOPY;  Service: Gastroenterology;   "Laterality: N/A;     Family History   Problem Relation Age of Onset    Heart disease Mother     Diabetes Mother     Hypertension Mother     Stroke Mother     Kidney disease Father     Heart disease Father     Diabetes Father     Hypertension Father     Kidney disease Sister     Diabetes Sister     Diabetes Other     Cancer Other     Stroke Other     Hypertension Other     Allergy (severe) Other     Cholelithiasis Other     Heart disease Sister     Stroke Sister      Social History     Tobacco Use    Smoking status: Every Day     Current packs/day: 1.00     Average packs/day: 1 pack/day for 35.0 years (35.0 ttl pk-yrs)     Types: Cigarettes    Smokeless tobacco: Former     Types: Chew   Vaping Use    Vaping status: Former    Substances: Nicotine    Devices: Refillable tank   Substance Use Topics    Alcohol use: Not Currently    Drug use: Not Currently     Types: Cocaine(coke), Marijuana     (Not in a hospital admission)    Allergies:  Patient has no known allergies.    Objective      Vital Signs  Ht 177.8 cm (70\")   Wt 136 kg (300 lb)   BMI 43.05 kg/m²     Physical Exam  Constitutional:       General: He is awake.      Appearance: Normal appearance. He is well-developed.   HENT:      Head: Normocephalic.   Eyes:      General: Lids are normal.      Extraocular Movements: Extraocular movements intact.      Conjunctiva/sclera: Conjunctivae normal.      Pupils: Pupils are equal, round, and reactive to light.   Pulmonary:      Effort: Pulmonary effort is normal.      Breath sounds: Normal breath sounds.   Musculoskeletal:         General: Normal range of motion.      Cervical back: Normal range of motion.   Skin:     General: Skin is warm.   Neurological:      Mental Status: He is alert and oriented to person, place, and time.      GCS: GCS eye subscore is 4. GCS verbal subscore is 5. GCS motor subscore is 6.      Cranial Nerves: No cranial nerve deficit.      Sensory: No sensory deficit.      Motor: Motor strength is " normal.     Deep Tendon Reflexes: Reflexes are normal and symmetric. Reflexes normal.   Psychiatric:         Speech: Speech normal.         Behavior: Behavior normal.         Thought Content: Thought content normal.         Neurological Exam  Mental Status  Awake and alert. Oriented to person, place and time. Oriented to person, place, and time. Speech is normal. Language is fluent with no aphasia. Attention and concentration are normal.    Cranial Nerves  CN I: Sense of smell is normal.  CN II: Right normal visual field. Left normal visual field.  CN III, IV, VI: Extraocular movements intact bilaterally. Normal lids and orbits bilaterally. Pupils equal round and reactive to light bilaterally.  CN V: Facial sensation is normal.  CN VII:  Right: There is no facial weakness.  Left: There is no facial weakness.  CN XI: Shoulder shrug strength is normal.  CN XII: Tongue midline without atrophy or fasciculations.    Motor  Normal muscle bulk throughout. Normal muscle tone. Strength is 5/5 throughout all four extremities.    Sensory  Sensation is intact to light touch, pinprick, vibration and proprioception in all four extremities.    Reflexes  Deep tendon reflexes are 2+ and symmetric in all four extremities.    Gait  Normal casual, toe, heel and tandem gait.       Imaging review: MRI of the lumbar spine that was done on April 23, 2025 shows bilateral foraminal narrowing at L4-5.  At L3-4 mild central canal stenosis and bilateral foraminal narrowing..  No cord signal change.  The conus is terminating at L1.  Axial cuts only go up to L3    L3-4      L4-5      Assessment/Plan: Patient is complaining mostly of back pain.  He does have foraminal narrowing at L3-4 and L4-5.  I am going to have him go for x-rays of the lumbar spine include standing flexion extension as well as scoliosis x-rays.  We will also make a referral to pain management to see about undergoing injections in his back in a pain management clinic.  I will  have him follow-up with Dr. Hernandez the next available appointment.  He was told to call us if any further problems or concerns.  Patient is a smoker. Smoking cessation classes given to the patient  The patient's Body mass index is 43.05 kg/m².. BMI is above normal parameters. Recommendations include: educational material and nutrition counseling    Diagnoses and all orders for this visit:    1. Chronic bilateral low back pain with left-sided sciatica (Primary)  -     XR Spine Scoliosis 2 or 3 Views; Future  -     XR Spine Lumbar Flex & Ext; Future  -     Ambulatory Referral to Pain Management Clinic    2. Lumbar stenosis without neurogenic claudication  -     XR Spine Scoliosis 2 or 3 Views; Future  -     XR Spine Lumbar Flex & Ext; Future  -     Ambulatory Referral to Pain Management Clinic    3. Class 3 severe obesity due to excess calories without serious comorbidity with body mass index (BMI) of 40.0 to 44.9 in adult    4. Smoker          I discussed the patients findings and my recommendations with patient    Louis Rodriguez, UBALDO  07/02/25  12:29 CDT

## (undated) DEVICE — CANN SMPL SOFTECH BIFLO ETCO2 A/M 7FT